# Patient Record
Sex: FEMALE | Race: WHITE | Employment: FULL TIME | URBAN - METROPOLITAN AREA
[De-identification: names, ages, dates, MRNs, and addresses within clinical notes are randomized per-mention and may not be internally consistent; named-entity substitution may affect disease eponyms.]

---

## 2019-06-24 ENCOUNTER — OFFICE VISIT (OUTPATIENT)
Dept: ORTHOPEDIC SURGERY | Age: 49
End: 2019-06-24
Payer: COMMERCIAL

## 2019-06-24 DIAGNOSIS — S46.011A STRAIN OF RIGHT ROTATOR CUFF CAPSULE, INITIAL ENCOUNTER: ICD-10-CM

## 2019-06-24 DIAGNOSIS — M67.911 TENDINOPATHY OF ROTATOR CUFF, RIGHT: Primary | ICD-10-CM

## 2019-06-24 PROCEDURE — G8427 DOCREV CUR MEDS BY ELIG CLIN: HCPCS | Performed by: INTERNAL MEDICINE

## 2019-06-24 PROCEDURE — 99243 OFF/OP CNSLTJ NEW/EST LOW 30: CPT | Performed by: INTERNAL MEDICINE

## 2019-06-24 PROCEDURE — G8421 BMI NOT CALCULATED: HCPCS | Performed by: INTERNAL MEDICINE

## 2019-06-24 RX ORDER — FEXOFENADINE HCL 180 MG/1
180 TABLET ORAL
COMMUNITY
Start: 2019-04-02

## 2019-06-24 NOTE — PROGRESS NOTES
Chief Complaint:   Chief Complaint   Patient presents with    Shoulder Pain     NEW L SHOULDER PAIN          History of Present Illness:       Patient is a 53 y.o. female presents with the above complaint. The symptoms began 3 weeks ago and started without an injury. The patient describes a sharp pain that does not radiate. She is seen in consultation at the request of Dr. Gal Hawley,  The symptoms are intermittentand  are improving since the onset. The abdomen is significantly escalated and correlated with increased activity levels with recreational swimming and sports activity. Work-up has included MRI evaluation which is outlined below    The symptoms do show a typical rotator cuff provacative pattern. The pain is  anterior about the shoulder. There are not associated mechanical symptoms localizing to the shoulder. The patient denies symptoms of instability about the shoulder. Treatment to date has included rest which have afforded significant benefit. Pain levels 1. The symptoms do not correlate with head and neck movement. The patient denies new onset weakness of the upper extremity. The patient does not have history of shoulder trauma. There is no history or autoimmune disease, inflammatory arthropathy or crystal arthropathy. Past Medical History:      No past medical history on file. No past surgical history on file. Present Medications:         Current Outpatient Medications   Medication Sig Dispense Refill    fexofenadine (ALLEGRA) 180 MG tablet Take 180 mg by mouth       No current facility-administered medications for this visit.           Allergies:      No Known Allergies     Social History:         Social History     Socioeconomic History    Marital status:      Spouse name: Not on file    Number of children: Not on file    Years of education: Not on file    Highest education level: Not on file   Occupational History    Not on file   Social Needs  Financial resource strain: Not on file   Street[place] insecurity:     Worry: Not on file     Inability: Not on file    Transportation needs:     Medical: Not on file     Non-medical: Not on file   Tobacco Use    Smoking status: Not on file   Substance and Sexual Activity    Alcohol use: Not on file    Drug use: Not on file    Sexual activity: Not on file   Lifestyle    Physical activity:     Days per week: Not on file     Minutes per session: Not on file    Stress: Not on file   Relationships    Social connections:     Talks on phone: Not on file     Gets together: Not on file     Attends Worship service: Not on file     Active member of club or organization: Not on file     Attends meetings of clubs or organizations: Not on file     Relationship status: Not on file    Intimate partner violence:     Fear of current or ex partner: Not on file     Emotionally abused: Not on file     Physically abused: Not on file     Forced sexual activity: Not on file   Other Topics Concern    Not on file   Social History Narrative    Not on file        Review of Symptoms:    Pertinent items are noted in HPI    Review of systems reviewed from Patient History Form dated on today's date and   available in the patient's chart under the Media tab. Vital Signs: There were no vitals filed for this visit. General Exam:     Constitutional: Patient is adequately groomed with no evidence of malnutrition  Mental Status: The patient is oriented to time, place and person. The patient's mood and affect are appropriate. Vascular: Examination reveals no swelling or calf tenderness. Peripheral pulses are palpable and 2+.     Lymphatics: no lymphadenopathy of the inguinal region or lower extremity      Physical Exam: right shoulder      Primary Exam:    Inspection: No deformity atrophy appreciable effusion      Palpation: No focal tenderness      Range of Motion: Full range and symmetric without pain      Strength: Near-normal without pain      Special Tests: Impingement sign negative proximal biceps signs and subscapularis signs negative      Skin: There are no rashes, ulcerations or lesions. Gait: Nonantalgic      Reflex intact upper     Additional Comments:        Additional Examinations:           Right Upper Extremity:  Examination of the right upper extremity does not show any tenderness, deformity or injury. Range of motion is unremarkable. There is no gross instability. There are no rashes, ulcerations or lesions. Strength and tone are normal.   Neurolgic -Light touch sensation and manual muscle testing normal L2-S1. No fasiculations. Pattella tendon and Achilles tendon reflexes +2 bilaterally. Seated SLR negative          Office Imaging Results/Procedures PerformedToday:      MRI evaluation dated 6/10/2019: Conclusions    1. Mild tendinosis involving the distal supraspinatus and infraspinatus tendons. Peritendinous edema involving the supraspinatus and infraspinatus is observed with a small subacromial and subdeltoid bursal fluid collection consistent with peritendinous inflammation or low-grade injury. 2.  A shallow partial-thickness intrasubstance tear measuring approximately 5 x 5 mm involves the supraspinatus insertional footprint       Office Procedures:     Orders Placed This Encounter   Procedures    Ambulatory referral to Physical Therapy     Referral Priority:   Routine     Referral Type:   Eval and Treat     Referral Reason:   Specialty Services Required     Requested Specialty:   Physical Therapy     Number of Visits Requested:   1           Other Outside Imaging and Testing Personally Reviewed:    No results found. Assessment   Impression: . Encounter Diagnoses   Name Primary?     Tendinopathy of rotator cuff, right Yes    Strain of right rotator cuff capsule, initial encounter         Supraspinatus footprint insertion tendinopathy and superimposed 5 x 5 mm intrasubstance tear suggested by MRI      Plan: Active modification of rotator cuff protocol  Home exercise program physical therapy rotator cuff conditioning  PRN use of NSAIDs GI precaution and ultrasound-guided subacromial injection only for escalating pain levels  Consider candidate for biologic orthopedic injection-PRP injection as needed if symptoms/condition warrants    Overall I believe her presentation is consistent with a acute rotator cuff tendinitis/strain with underlying tendinopathy change. Proceed as outlined above      The nature of the finding, probable diagnosis and likely treatment was thoroughly discussed with the patient. The options, risks, complications, alternative treatment as well as some of the differential diagnosis was discussed. The patient was thoroughly informed and all questions were answered. the patient indicated understanding and satisfaction with the discussion. Orders:        Orders Placed This Encounter   Procedures    Ambulatory referral to Physical Therapy     Referral Priority:   Routine     Referral Type:   Eval and Treat     Referral Reason:   Specialty Services Required     Requested Specialty:   Physical Therapy     Number of Visits Requested:   1           Disclaimer: \"This note was dictated with voice recognition software. Though review and correction are routine, we apologize for any errors. \"

## 2019-07-01 ENCOUNTER — HOSPITAL ENCOUNTER (OUTPATIENT)
Dept: PHYSICAL THERAPY | Age: 49
Setting detail: THERAPIES SERIES
Discharge: HOME | End: 2019-07-01
Payer: COMMERCIAL

## 2019-07-01 PROCEDURE — 97110 THERAPEUTIC EXERCISES: CPT

## 2019-07-01 PROCEDURE — 97161 PT EVAL LOW COMPLEX 20 MIN: CPT

## 2019-07-01 NOTE — FLOWSHEET NOTE
Manual (55552) x  1    [] Other:  [] TA x       [] Mech Traction (71729)  [] ES(attended) (36954)      [] ES (un) (15622):     GOALS:  Patient stated goal: decrease pain with lifting and driving     Therapist goals for Patient:   Short Term Goals: To be achieved in: 2 weeks  1. Independent in HEP and progression per patient tolerance, in order to prevent re-injury. 2. Patient will have a decrease in pain to facilitate improvement in movement, function, and ADLs as indicated by Functional Deficits.     Long Term Goals: To be achieved in: 4-6 weeks  1. Disability index score of 0% or less for the DASH to assist with reaching prior level of function. 2. Patient will demonstrate increased AROM to WNL to allow for proper joint functioning as indicated by patients Functional Deficits. 3. Patient will demonstrate an increase in Strength to within 5lbs to allow for proper functional mobility as indicated by patients Functional Deficits. 4. Patient will return to light lifting functional activities without increased symptoms or restriction. 5. Pt will tolerate driving long distances without increased pain                Progression Towards Functional goals:  [] Patient is progressing as expected towards functional goals listed. [] Progression is slowed due to complexities listed. [] Progression has been slowed due to co-morbidities. [x] Plan just implemented, too soon to assess goals progression  [] Other:     ASSESSMENT:  See eval    Treatment/Activity Tolerance:  [x] Patient tolerated treatment well [] Patient limited by fatique  [] Patient limited by pain  [] Patient limited by other medical complications  [] Other:     Prognosis: [x] Good [] Fair  [] Poor    Patient Requires Follow-up: [x] Yes  [] No    PLAN: See eval  [] Continue per plan of care [] Alter current plan (see comments)  [x] Plan of care initiated [] Hold pending MD visit [] Discharge    Electronically signed by:  Tom Gray PT, DPT

## 2019-07-01 NOTE — PLAN OF CARE
Functional Deficits. 4. Patient will return to light lifting functional activities without increased symptoms or restriction. 5. Pt will tolerate driving long distances without increased pain       Electronically signed by:   Constantino Nunez PT

## 2019-07-09 ENCOUNTER — HOSPITAL ENCOUNTER (OUTPATIENT)
Dept: PHYSICAL THERAPY | Age: 49
Setting detail: THERAPIES SERIES
Discharge: HOME | End: 2019-07-09
Payer: COMMERCIAL

## 2019-07-09 PROCEDURE — 97110 THERAPEUTIC EXERCISES: CPT

## 2019-07-09 PROCEDURE — 97140 MANUAL THERAPY 1/> REGIONS: CPT

## 2019-07-16 ENCOUNTER — HOSPITAL ENCOUNTER (OUTPATIENT)
Dept: PHYSICAL THERAPY | Age: 49
Setting detail: THERAPIES SERIES
Discharge: HOME | End: 2019-07-16
Payer: COMMERCIAL

## 2019-07-16 NOTE — FLOWSHEET NOTE
[facility]    Physical Therapy  Cancellation/No-show Note  Patient Name:  Ivan Mota  :  1970   Date:  2019  Cancelled visits to date: 1  No-shows to date: 0    For today's appointment patient:  [x]  Cancelled  []  Rescheduled appointment  []  No-show     Reason given by patient:  []  Patient ill  []  Conflicting appointment  []  No transportation    []  Conflict with work  [x]  No reason given  []  Other:     Comments:      Electronically signed by:   Yosvany Blanc PT

## 2019-07-17 ENCOUNTER — APPOINTMENT (OUTPATIENT)
Dept: PHYSICAL THERAPY | Age: 49
End: 2019-07-17
Payer: COMMERCIAL

## 2020-07-28 ENCOUNTER — TELEPHONE (OUTPATIENT)
Dept: PRIMARY CARE CLINIC | Age: 50
End: 2020-07-28

## 2021-06-07 ENCOUNTER — OFFICE VISIT (OUTPATIENT)
Dept: PRIMARY CARE CLINIC | Age: 51
End: 2021-06-07
Payer: COMMERCIAL

## 2021-06-07 VITALS
SYSTOLIC BLOOD PRESSURE: 130 MMHG | BODY MASS INDEX: 24.25 KG/M2 | HEART RATE: 68 BPM | OXYGEN SATURATION: 98 % | DIASTOLIC BLOOD PRESSURE: 76 MMHG | WEIGHT: 160 LBS | HEIGHT: 68 IN

## 2021-06-07 DIAGNOSIS — Z12.4 CERVICAL CANCER SCREENING: ICD-10-CM

## 2021-06-07 DIAGNOSIS — Z12.31 ENCOUNTER FOR SCREENING MAMMOGRAM FOR MALIGNANT NEOPLASM OF BREAST: ICD-10-CM

## 2021-06-07 DIAGNOSIS — Z23 NEED FOR TDAP VACCINATION: ICD-10-CM

## 2021-06-07 DIAGNOSIS — Z00.00 ROUTINE ADULT HEALTH MAINTENANCE: Primary | ICD-10-CM

## 2021-06-07 DIAGNOSIS — Z13.220 LIPID SCREENING: ICD-10-CM

## 2021-06-07 PROCEDURE — 90471 IMMUNIZATION ADMIN: CPT | Performed by: NURSE PRACTITIONER

## 2021-06-07 PROCEDURE — 99386 PREV VISIT NEW AGE 40-64: CPT | Performed by: NURSE PRACTITIONER

## 2021-06-07 PROCEDURE — 90715 TDAP VACCINE 7 YRS/> IM: CPT | Performed by: NURSE PRACTITIONER

## 2021-06-07 RX ORDER — B-COMPLEX WITH VITAMIN C
2 TABLET ORAL DAILY
COMMUNITY
Start: 2021-06-07 | End: 2021-09-03 | Stop reason: SINTOL

## 2021-06-07 RX ORDER — MULTIVIT WITH CALCIUM,IRON,MIN 18MG-0.4MG
1 TABLET ORAL DAILY
COMMUNITY
Start: 2021-06-07

## 2021-06-07 RX ORDER — FLUTICASONE PROPIONATE 50 MCG
1 SPRAY, SUSPENSION (ML) NASAL DAILY
COMMUNITY

## 2021-06-07 ASSESSMENT — PATIENT HEALTH QUESTIONNAIRE - PHQ9
SUM OF ALL RESPONSES TO PHQ QUESTIONS 1-9: 0
2. FEELING DOWN, DEPRESSED OR HOPELESS: 0
SUM OF ALL RESPONSES TO PHQ QUESTIONS 1-9: 0
SUM OF ALL RESPONSES TO PHQ QUESTIONS 1-9: 0
1. LITTLE INTEREST OR PLEASURE IN DOING THINGS: 0
SUM OF ALL RESPONSES TO PHQ9 QUESTIONS 1 & 2: 0

## 2021-06-07 ASSESSMENT — ENCOUNTER SYMPTOMS
GASTROINTESTINAL NEGATIVE: 1
CHEST TIGHTNESS: 0
EYES NEGATIVE: 1
APNEA: 0
SHORTNESS OF BREATH: 0

## 2021-06-07 NOTE — PROGRESS NOTES
6/7/2021    Chief Complaint   Patient presents with   [address] [facility] Patient    Annual Exam       Samantha Roberson is a 53 y.o. female, presents today as a new patient to [facility] and myself    HPI   Patient presents for annual physical exam. Patient is overall healthy and denies having any health concerns or complaints today. She does not take daily multivitamin, calcium or vitamin D; recommended patient to start taking daily. Seasonal and environmental allergies  Patient has a history of well controlled seasonal and environmental allergies that is managed by an allergist. Patient is taking Flonase, 1 spray per nostril and Allegra daily. CARE GAPS:  - Mammogram: ordered today. Patient to schedule on mobile unit in mid-June. - Tdap: Administer today. - Cervical cancer screening: last PAP ~ 2018. Referral to Dr. Tonie Jane today. - Lipid screen: Ordered today. Review of Systems   Constitutional: Negative for activity change, appetite change, fatigue and unexpected weight change. HENT: Negative. Eyes: Negative. Respiratory: Negative for apnea, chest tightness and shortness of breath. Cardiovascular: Negative for chest pain, palpitations and leg swelling. Gastrointestinal: Negative. Endocrine: Negative. Genitourinary: Negative. Musculoskeletal: Negative for arthralgias and myalgias. Neurological: Negative for dizziness, weakness, light-headedness and headaches. Psychiatric/Behavioral: Negative for dysphoric mood. The patient is not nervous/anxious. Current Outpatient Medications on File Prior to Visit   Medication Sig Dispense Refill    fluticasone (FLONASE) 50 MCG/ACT nasal spray 1 spray by Each Nostril route daily      fexofenadine (ALLEGRA) 180 MG tablet Take 180 mg by mouth       No current facility-administered medications on file prior to visit.       Allergies   Allergen Reactions    Ibuprofen Hives    Sulfa Antibiotics Hives     Past Medical Mood and Affect: Mood normal.         Behavior: Behavior normal.         ASSESSMENT/PLAN:  1. Routine adult health maintenance  - No abnormalities noted. - Start Calcium Carbonate-Vitamin D (OYSTER SHELL CALCIUM/D) 500-200 MG-UNIT TABS; Take 2 tablets by mouth daily  - Start Multiple Vitamins-Minerals (QC WOMENS DAILY MULTIVITAMIN) TABS; Take 1 tablet by mouth daily    2. Need for Tdap vaccination  - Tdap (age 6y and older) IM (Boostrix) -  Administered. 3. Encounter for screening mammogram for malignant neoplasm of breast  - [facility] PREM DIGITAL SCREEN BILATERAL; Future  - Patient to schedule; phone number provided. 4. Cervical cancer screening  - [facility] - Dodie Amor MD, Obstetrics, [facility] - [place] [place]  - Patient to schedule an appointment; phone number provided. 5. Lipid screening  - Lipid, Fasting; Future  - Will call patient with results. Return in about 1 year (around 6/7/2022) for annual physical exam.     Discussed use, benefit, and side effects of prescribed medications. Patient's questions answered and concerns addressed. Patient agrees to plan of care. My scheduled days in the office reviewed with patient, and same day appointments available. Encouraged to use [employer] for communication as needed.      Electronically signed by ROGELIO Hernandez CNP on 6/7/2021 at 5:15 PM

## 2021-06-18 ENCOUNTER — HOSPITAL ENCOUNTER (OUTPATIENT)
Dept: MAMMOGRAPHY | Age: 51
Discharge: HOME | End: 2021-06-22
Payer: COMMERCIAL

## 2021-06-18 VITALS — WEIGHT: 152 LBS | HEIGHT: 67 IN | BODY MASS INDEX: 23.86 KG/M2

## 2021-06-18 DIAGNOSIS — Z12.31 VISIT FOR SCREENING MAMMOGRAM: ICD-10-CM

## 2021-06-18 PROCEDURE — 77063 BREAST TOMOSYNTHESIS BI: CPT

## 2021-06-21 ENCOUNTER — TELEPHONE (OUTPATIENT)
Dept: PRIMARY CARE CLINIC | Age: 51
End: 2021-06-21

## 2021-06-21 NOTE — TELEPHONE ENCOUNTER
----- Message from Rohan Watson sent at 6/18/2021  4:19 PM EDT -----  Subject: Message to Provider    QUESTIONS  Information for Provider? Patient would like a doctors note for work. Appointment date and time 6/18/2021 3:30  ---------------------------------------------------------------------------  --------------  CALL BACK INFO  What is the best way for the office to contact you? OK to leave message on   voicemail  Preferred Call Back Phone Number? [phone]  ---------------------------------------------------------------------------  --------------  SCRIPT ANSWERS  Relationship to Patient?  Self

## 2021-06-24 ENCOUNTER — TELEPHONE (OUTPATIENT)
Dept: WOMENS IMAGING | Age: 51
End: 2021-06-24

## 2021-06-29 ENCOUNTER — TELEPHONE (OUTPATIENT)
Dept: WOMENS IMAGING | Age: 51
End: 2021-06-29

## 2021-07-01 ENCOUNTER — TELEPHONE (OUTPATIENT)
Dept: PRIMARY CARE CLINIC | Age: 51
End: 2021-07-01

## 2021-07-01 DIAGNOSIS — R92.8 ABNORMAL MAMMOGRAM OF RIGHT BREAST: Primary | ICD-10-CM

## 2021-07-01 NOTE — TELEPHONE ENCOUNTER
Called patient to review screening mammogram results. Patient verified full name and date of birth prior to reviewing results. - Breast density: The breasts are heterogeneously dense, which may obscure  small masses. - Left breast: No new suspicious masses or microcalcifications are identified  in the left breast.    - Right breast: Asymmetry for which additional imaging evaluation is  recommended with diagnostic mammography and potential breast ultrasound.       Additional Images:   - Right breast diagnostic mammogram and US breast ordered on 6/29/2021 by Dr. Janet Tom- both scheduled on 7/7/2021 at [facility].

## 2021-07-07 ENCOUNTER — HOSPITAL ENCOUNTER (OUTPATIENT)
Dept: ULTRASOUND IMAGING | Age: 51
Discharge: HOME | End: 2021-07-07
Payer: COMMERCIAL

## 2021-07-07 ENCOUNTER — HOSPITAL ENCOUNTER (OUTPATIENT)
Dept: WOMENS IMAGING | Age: 51
Discharge: HOME | End: 2021-07-07
Payer: COMMERCIAL

## 2021-07-07 DIAGNOSIS — R92.8 ABNORMAL FINDING ON MAMMOGRAPHY: ICD-10-CM

## 2021-07-07 PROCEDURE — 76642 ULTRASOUND BREAST LIMITED: CPT

## 2021-07-07 PROCEDURE — G0279 TOMOSYNTHESIS, MAMMO: HCPCS

## 2021-07-23 DIAGNOSIS — Z13.220 LIPID SCREENING: ICD-10-CM

## 2021-07-23 LAB
CHOLESTEROL, FASTING: 200 MG/DL (ref 0–199)
HDLC SERPL-MCNC: 75 MG/DL (ref 40–60)
LDL CHOLESTEROL CALCULATED: 112 MG/DL
TRIGLYCERIDE, FASTING: 65 MG/DL (ref 0–150)
VLDLC SERPL CALC-MCNC: 13 MG/DL

## 2021-08-11 ENCOUNTER — TELEPHONE (OUTPATIENT)
Dept: PRIMARY CARE CLINIC | Age: 51
End: 2021-08-11

## 2021-08-11 NOTE — TELEPHONE ENCOUNTER
I spoke with Clayton Napier and she needs an order for a Mammogram in 6 months from the date of her last mammogram. Please call patient to advise.

## 2021-08-16 DIAGNOSIS — N60.01 BENIGN CYST OF RIGHT BREAST: Primary | ICD-10-CM

## 2021-08-16 NOTE — PROGRESS NOTES
Patient sent my chart message requesting 6-month follow-up mammogram order of right breast.  Right mammogram ordered patient notified via [employer] message. EXAMINATION:   DIAGNOSTIC DIGITAL RIGHT BREAST MAMMOGRAM WITH TOMOSYNTHESIS; TARGETED   ULTRASOUND OF THE RIGHT BREAST, 7/7/2021 7:57 am; 7/7/2021 8:12 am       TECHNIQUE:   Diagnostic mammography of the right breast was performed with tomosynthesis.    2D standard and 3D tomosynthesis combination imaging performed through the   right breast.  Computer aided detection was utilized in the interpretation of   this exam.; Targeted ultrasound of the right breast was performed.       Views: 3       COMPARISON:   06/18/2021       HISTORY:   ORDERING SYSTEM PROVIDED HISTORY: Abnormal finding on mammography   TECHNOLOGIST PROVIDED HISTORY:   Is the patient pregnant?->No; ORDERING SYSTEM PROVIDED HISTORY: Abnormal   finding on mammography       FINDINGS:   Today's images confirm persistence of 2 small partially circumscribed 5 mm   nodular asymmetries within the far posterior 12 o'clock aspect of the right   breast.       Targeted right breast ultrasound was performed demonstrating 3 tiny simple   cysts within the 11 and 12 o'clock positions approximately 4-6 cm from the   nipple.  These range in size from 4-6 mm.  There are no suspicious solid   masses or focal areas of posterior acoustic shadowing.  These are felt to   likely correspond with the mammographic asymmetries.           Impression   Probably benign right breast cysts.       BIRADS:   ACR BI-RADS category 3-probably benign.       Recommendation: Six-month follow-up right breast mammography to document the   benign nature of findings.

## 2021-09-03 ENCOUNTER — OFFICE VISIT (OUTPATIENT)
Dept: PRIMARY CARE CLINIC | Age: 51
End: 2021-09-03
Payer: COMMERCIAL

## 2021-09-03 VITALS
WEIGHT: 155 LBS | SYSTOLIC BLOOD PRESSURE: 110 MMHG | DIASTOLIC BLOOD PRESSURE: 78 MMHG | HEIGHT: 67 IN | OXYGEN SATURATION: 98 % | BODY MASS INDEX: 24.33 KG/M2 | HEART RATE: 70 BPM

## 2021-09-03 DIAGNOSIS — M79.672 LEFT FOOT PAIN: Primary | ICD-10-CM

## 2021-09-03 DIAGNOSIS — Z00.00 HEALTH CARE MAINTENANCE: ICD-10-CM

## 2021-09-03 PROCEDURE — 99213 OFFICE O/P EST LOW 20 MIN: CPT | Performed by: NURSE PRACTITIONER

## 2021-09-03 RX ORDER — CALCIUM CARBONATE 200(500)MG
TABLET,CHEWABLE ORAL
COMMUNITY
Start: 2021-09-03

## 2021-09-03 RX ORDER — MELATONIN
1000 DAILY
COMMUNITY
Start: 2021-09-03

## 2021-09-03 RX ORDER — METHYLPREDNISOLONE 4 MG/1
TABLET ORAL
Qty: 1 KIT | Refills: 0 | Status: SHIPPED | OUTPATIENT
Start: 2021-09-03 | End: 2021-09-09

## 2021-09-03 NOTE — PROGRESS NOTES
9/3/2021    Chief Complaint   Patient presents with    Foot Pain     left foot lasting for 2 weeks       Marzena Sandoval is a 53 y.o. female, presents today for left foot pain    HPI  Foot pain  Patient reports pain to top of left foot with mild swelling with walking. The pain is at a severity of 4/10, and moderate. Exacerbated by: walking without shoes. 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 She started using a Theragun (massage guns) to bottom of left foot- with significant improvement of pain. History of collapsing arches, bilaterally. She admits prior to onset of pain she was predominantly barefoot around house and works from home. She started to use shoe inserts 3 days ago, with significant improvement and is able to bear weight without pain. She is unable to tolerate NSAIDS. She believes she took a Medrol Dosepak in the past. S/p fracture to left 5th metatarsal with plate/screws (5026). Pertinent negatives include no inability to bear weight, joint swelling, limited range of motion, numbness, stiffness or tingling. Osteoporosis prevention   Patient our last visit Nic Morris was initiated, however after patient read ingrediants she realized medication was contraindicated and did not take due to shellfish allergy. Shellfish allergy added to chart today. Will initiate TUMS 1000 mg daily and Vitamin D 5,000 daily. Mother with history of osteoporosis. Review of Systems   Constitutional: Negative for activity change. Musculoskeletal: Negative for gait problem, joint swelling and stiffness. Left foot pain   Neurological: Negative for tingling, weakness and numbness.        Current Outpatient Medications on File Prior to Visit   Medication Sig Dispense Refill    fluticasone (FLONASE) 50 MCG/ACT nasal spray 1 spray by Each Nostril route daily      Multiple Vitamins-Minerals (QC WOMENS DAILY MULTIVITAMIN) TABS Take 1 tablet by mouth daily      fexofenadine (ALLEGRA) 180 MG tablet Take 180 mg by mouth       No current facility-administered medications on file prior to visit. Allergies   Allergen Reactions    Ibuprofen Hives    Shellfish-Derived Products      Hives, swelling    Sulfa Antibiotics Hives     Past Medical History:   Diagnosis Date    Environmental and seasonal allergies      Past Surgical History:   Procedure Laterality Date    BREAST BIOPSY      WISDOM TOOTH EXTRACTION        Social History     Tobacco Use    Smoking status: Never Smoker    Smokeless tobacco: Never Used   Substance Use Topics    Alcohol use: Yes     Comment: occassional      Family History   Problem Relation Age of Onset    Heart Disease Mother     Hypertension Mother     Heart Disease Father     Diabetes type 2  Father         Diagnosed between late 48s-early 56s   [employer] Kidney Disease Father         Renal Failure    Hypertension Father     No Known Problems Brother     No Known Problems Daughter     No Known Problems Daughter         Vitals:    09/03/21 1320   BP: 110/78   Site: Left Upper Arm   Position: Sitting   Cuff Size: Medium Adult   Pulse: 70   SpO2: 98%   Weight: 155 lb (70.3 kg)   Height: 5' 7\" (1.702 m)     Estimated body mass index is 24.28 kg/m² as calculated from the following:    Height as of this encounter: 5' 7\" (1.702 m). Weight as of this encounter: 155 lb (70.3 kg). Physical Exam  Vitals and nursing note reviewed. Constitutional:       General: She is not in acute distress. Appearance: Normal appearance. She is normal weight. Cardiovascular:      Rate and Rhythm: Normal rate and regular rhythm. Pulses: Normal pulses. Dorsalis pedis pulses are 2+ on the left side. Heart sounds: Normal heart sounds, S1 normal and S2 normal.   Pulmonary:      Effort: Pulmonary effort is normal.      Breath sounds: Normal breath sounds. Musculoskeletal:         General: Normal range of motion. Cervical back: Normal range of motion and neck supple.       Right lower leg: No edema. Left lower leg: No edema. Feet:    Feet:      Left foot:      Skin integrity: Skin integrity normal. No skin breakdown, erythema, warmth or dry skin. Toenail Condition: Left toenails are normal.      Comments: No pain with palpation  Skin:     General: Skin is warm and dry. Neurological:      General: No focal deficit present. Mental Status: She is alert and oriented to person, place, and time. Psychiatric:         Mood and Affect: Mood normal.         Behavior: Behavior normal.         ASSESSMENT/PLAN:  1. Left foot pain  - New.  -Start methylPREDNISolone (MEDROL DOSEPACK) 4 MG tablet; Take by mouth daily as prescribed on packaging  Dispense: 1 kit; Refill: 0 (instructed patient to take all pills for each day all in the morning with breakfast rather than indicated on pack)  -Tylenol 500 to 8000 mg every 8 hours as needed for pain. -Ice  -Continue to wear supportive shoes with inserts at all times.  -Continue Theragun   -Send [employer] message if no improvement and will refer to podiatrist; patient verbalizes understanding. 2. Health care maintenance  -Start calcium carbonate (ANTACID) 500 MG chewable tablet; Chew 2 tablets daily (1000 mg) by mouth daily  -Start vitamin D3 (CHOLECALCIFEROL) 25 MCG (1000 UT) TABS tablet; Take 1 tablet by mouth daily  -Shellfish allergy added to chart today. Return if symptoms worsen or fail to improve and will refer to podiatrist.     Discussed use, benefit, and side effects of prescribed medications. Patient's questions answered and concerns addressed. Patient agrees to plan of care. My scheduled days in the office reviewed with patient, and same day appointments available. Encouraged to use [employer] for communication as needed. Electronically signed by ROGELIO Garzon CNP on 9/3/2021 at 3:32 PM       This dictation was generated by voice recognition computer software.  Although all attempts are made to edit the dictation for accuracy, there may be errors in the transcription that are not intended.

## 2021-09-09 LAB
HPV COMMENT: NORMAL
HPV TYPE 16: NOT DETECTED
HPV TYPE 18: NOT DETECTED
HPVOH (OTHER TYPES): NOT DETECTED

## 2021-09-14 ENCOUNTER — PATIENT MESSAGE (OUTPATIENT)
Dept: PRIMARY CARE CLINIC | Age: 51
End: 2021-09-14

## 2021-09-14 DIAGNOSIS — Z13.220 LIPID SCREENING: Primary | ICD-10-CM

## 2021-09-14 DIAGNOSIS — Z13.1 DIABETES MELLITUS SCREENING: ICD-10-CM

## 2021-09-15 NOTE — TELEPHONE ENCOUNTER
From: Renaldo See  To: Ebonykarly Karissaluis, APRN - CNP  Sent: 9/14/2021 6:49 PM EDT  Subject: Visit Follow-Up Question    Hi,    I wanted to check back with you on my foot pain that you saw me for on September 3. Its A LOT better, it does at times feel a little stiff and sore but nothing compared to what it was. I wanted ask if I could do bloodwork? when I have them done recently it just showed cholesterols. I would like repeat that, as well as find out what some of my other tests would show, such as sugar, tryglycerides etc. would this be possible? I would like to see where I'm at now so I have a baseline since I have been working on some dietary and exercise changes.     thank you,  Krista Yu

## 2021-10-25 ENCOUNTER — PATIENT MESSAGE (OUTPATIENT)
Dept: PRIMARY CARE CLINIC | Age: 51
End: 2021-10-25

## 2021-10-25 DIAGNOSIS — B37.9 YEAST INFECTION: Primary | ICD-10-CM

## 2021-10-25 DIAGNOSIS — B37.31 VAGINAL YEAST INFECTION: ICD-10-CM

## 2021-10-26 RX ORDER — FLUCONAZOLE 150 MG/1
150 TABLET ORAL ONCE
Qty: 1 TABLET | Refills: 0 | Status: SHIPPED | OUTPATIENT
Start: 2021-10-26 | End: 2021-10-26

## 2021-12-01 ENCOUNTER — HOSPITAL ENCOUNTER (OUTPATIENT)
Dept: WOMENS IMAGING | Age: 51
Discharge: HOME | End: 2021-12-01
Payer: COMMERCIAL

## 2021-12-01 DIAGNOSIS — N60.01 BENIGN CYST OF RIGHT BREAST: ICD-10-CM

## 2021-12-01 PROCEDURE — G0279 TOMOSYNTHESIS, MAMMO: HCPCS

## 2021-12-10 DIAGNOSIS — Z13.1 DIABETES MELLITUS SCREENING: ICD-10-CM

## 2021-12-10 DIAGNOSIS — Z13.220 LIPID SCREENING: ICD-10-CM

## 2021-12-10 LAB
CHOLESTEROL, FASTING: 176 MG/DL (ref 0–199)
HDLC SERPL-MCNC: 72 MG/DL (ref 40–60)
LDL CHOLESTEROL CALCULATED: 89 MG/DL
TRIGLYCERIDE, FASTING: 74 MG/DL (ref 0–150)
VLDLC SERPL CALC-MCNC: 15 MG/DL

## 2021-12-11 LAB
ESTIMATED AVERAGE GLUCOSE: 102.5 MG/DL
HBA1C MFR BLD: 5.2 %

## 2022-02-25 ENCOUNTER — PATIENT MESSAGE (OUTPATIENT)
Dept: PRIMARY CARE CLINIC | Age: 52
End: 2022-02-25

## 2022-02-25 DIAGNOSIS — M54.50 ACUTE BILATERAL LOW BACK PAIN WITHOUT SCIATICA: Primary | ICD-10-CM

## 2022-02-25 DIAGNOSIS — M62.89 MUSCLE TIGHTNESS: ICD-10-CM

## 2022-02-25 RX ORDER — CYCLOBENZAPRINE HCL 5 MG
5 TABLET ORAL 2 TIMES DAILY PRN
Qty: 10 TABLET | Refills: 0 | Status: SHIPPED | OUTPATIENT
Start: 2022-02-25 | End: 2022-03-07

## 2022-02-25 RX ORDER — METHYLPREDNISOLONE 4 MG/1
TABLET ORAL
Qty: 1 KIT | Refills: 0 | Status: SHIPPED | OUTPATIENT
Start: 2022-02-25 | End: 2022-03-03

## 2022-02-25 NOTE — TELEPHONE ENCOUNTER
1. Acute bilateral low back pain without sciatica  - New  - Start methylPREDNISolone (MEDROL DOSEPACK) 4 MG tablet; TAKE TABLETS BY MOUTH AS DIRECTED ON DOSEPAK  Dispense: 1 kit; Refill: 0    2. Muscle tightness  - New  - Start cyclobenzaprine (FLEXERIL) 5 MG tablet; Take 1 tablet by mouth 2 times daily as needed for Muscle spasms  Dispense: 10 tablet;  Refill: 0

## 2022-02-25 NOTE — TELEPHONE ENCOUNTER
From: Juanita Prakash  To: Jose   Sent: 2/25/2022 10:49 AM EST  Subject: Monday Appt    Kenny Bazzi,    I have an appointment with you on Monday at 7pm for low back pain. Im really having a good amount of pain, my low back feels like a ball of tightness. I cant take Ibuprofen and tylenol isn't doing a great deal.     I am applying heat and ice which help some. Do you have any recommendations how I can manage this until Monday evening?     thank you,  Kelly Naylor

## 2022-02-28 ENCOUNTER — OFFICE VISIT (OUTPATIENT)
Dept: PRIMARY CARE CLINIC | Age: 52
End: 2022-02-28
Payer: COMMERCIAL

## 2022-02-28 VITALS
WEIGHT: 164.6 LBS | HEART RATE: 63 BPM | DIASTOLIC BLOOD PRESSURE: 62 MMHG | BODY MASS INDEX: 25.83 KG/M2 | OXYGEN SATURATION: 97 % | TEMPERATURE: 96.8 F | SYSTOLIC BLOOD PRESSURE: 106 MMHG | HEIGHT: 67 IN

## 2022-02-28 DIAGNOSIS — M54.50 ACUTE MIDLINE LOW BACK PAIN WITHOUT SCIATICA: Primary | ICD-10-CM

## 2022-02-28 PROCEDURE — 99213 OFFICE O/P EST LOW 20 MIN: CPT | Performed by: NURSE PRACTITIONER

## 2022-02-28 RX ORDER — ACETAMINOPHEN 500 MG
1000 TABLET ORAL EVERY 6 HOURS PRN
COMMUNITY

## 2022-02-28 ASSESSMENT — ENCOUNTER SYMPTOMS
BACK PAIN: 1
RESPIRATORY NEGATIVE: 1
CONSTIPATION: 0
DIARRHEA: 0

## 2022-03-01 NOTE — PATIENT INSTRUCTIONS
Patient Education        Acute Low Back Pain: Exercises  Introduction  Here are some examples of typical rehabilitation exercises for your condition. Start each exercise slowly. Ease off the exercise if you start to have pain. Your doctor or physical therapist will tell you when you can start these exercises and which ones will work best for you. When you are not being active, find a comfortable position for rest. Some people are comfortable on the floor or a medium-firm bed with a small pillow under their head and another under their knees. Some people prefer to lie on their side with a pillow between their knees. Don't stay in one position for too long. Take short walks (10 to 20 minutes) every 2 to 3 hours. Avoid slopes, hills, and stairs until you feel better. Walk only distances you can manage without pain, especially leg pain. How to do the exercises  Back stretches    1. Get down on your hands and knees on the floor. 2. Relax your head and allow it to droop. Round your back up toward the ceiling until you feel a nice stretch in your upper, middle, and lower back. Hold this stretch for as long as it feels comfortable, or about 15 to 30 seconds. 3. Return to the starting position with a flat back while you are on your hands and knees. 4. Let your back sway by pressing your stomach toward the floor. Lift your buttocks toward the ceiling. 5. Hold this position for 15 to 30 seconds. 6. Repeat 2 to 4 times. Follow-up care is a key part of your treatment and safety. Be sure to make and go to all appointments, and call your doctor if you are having problems. It's also a good idea to know your test results and keep a list of the medicines you take. Where can you learn more? Go to https://cam.[employer]. org and sign in to your [employer] account. Enter W685 in the [employer] box to learn more about \"Acute Low Back Pain: Exercises. \"     If you do not have an account, please click on the \"Sign Up Now\" link. Current as of: September 8, 2021               Content Version: 13.1  © 2006-2021 [employer]. Care instructions adapted under license by [facility] ([facility]). If you have questions about a medical condition or this instruction, always ask your healthcare professional. [address] any warranty or liability for your use of this information. Patient Education        Low Back Pain: Exercises  Introduction  Here are some examples of exercises for you to try. The exercises may be suggested for a condition or for rehabilitation. Start each exercise slowly. Ease off the exercises if you start to have pain. You will be told when to start these exercises and which ones will work best for you. How to do the exercises  Press-up    1. Lie on your stomach, supporting your body with your forearms. 2. Press your elbows down into the floor to raise your upper back. As you do this, relax your stomach muscles and allow your back to arch without using your back muscles. As your press up, do not let your hips or pelvis come off the floor. 3. Hold for 15 to 30 seconds, then relax. 4. Repeat 2 to 4 times. Alternate arm and leg (bird dog) exercise    Do this exercise slowly. Try to keep your body straight at all times, and do not let one hip drop lower than the other. 1. Start on the floor, on your hands and knees. 2. Tighten your belly muscles. 3. Raise one leg off the floor, and hold it straight out behind you. Be careful not to let your hip drop down, because that will twist your trunk. 4. Hold for about 6 seconds, then lower your leg and switch to the other leg. 5. Repeat 8 to 12 times on each leg. 6. Over time, work up to holding for 10 to 30 seconds each time. 7. If you feel stable and secure with your leg raised, try raising the opposite arm straight out in front of you at the same time. Knee-to-chest exercise    1.  Lie on your back with your knees bent and your feet flat on the floor. 2. Bring one knee to your chest, keeping the other foot flat on the floor (or keeping the other leg straight, whichever feels better on your lower back). 3. Keep your lower back pressed to the floor. Hold for at least 15 to 30 seconds. 4. Relax, and lower the knee to the starting position. 5. Repeat with the other leg. Repeat 2 to 4 times with each leg. 6. To get more stretch, put your other leg flat on the floor while pulling your knee to your chest.  Curl-ups    1. Lie on the floor on your back with your knees bent at a 90-degree angle. Your feet should be flat on the floor, about 12 inches from your buttocks. 2. Cross your arms over your chest. If this bothers your neck, try putting your hands behind your neck (not your head), with your elbows spread apart. 3. Slowly tighten your belly muscles and raise your shoulder blades off the floor. 4. Keep your head in line with your body, and do not press your chin to your chest.  5. Hold this position for 1 or 2 seconds, then slowly lower yourself back down to the floor. 6. Repeat 8 to 12 times. Pelvic tilt exercise    1. Lie on your back with your knees bent. 2. \"Brace\" your stomach. This means to tighten your muscles by pulling in and imagining your belly button moving toward your spine. You should feel like your back is pressing to the floor and your hips and pelvis are rocking back. 3. Hold for about 6 seconds while you breathe smoothly. 4. Repeat 8 to 12 times. Heel dig bridging    1. Lie on your back with both knees bent and your ankles bent so that only your heels are digging into the floor. Your knees should be bent about 90 degrees. 2. Then push your heels into the floor, squeeze your buttocks, and lift your hips off the floor until your shoulders, hips, and knees are all in a straight line.   3. Hold for about 6 seconds as you continue to breathe normally, and then slowly lower your hips back down to the floor and rest for up to 10 seconds. 4. Do 8 to 12 repetitions. Hamstring stretch in doorway    1. Lie on your back in a doorway, with one leg through the open door. 2. Slide your leg up the wall to straighten your knee. You should feel a gentle stretch down the back of your leg. 3. Hold the stretch for at least 15 to 30 seconds. Do not arch your back, point your toes, or bend either knee. Keep one heel touching the floor and the other heel touching the wall. 4. Repeat with your other leg. 5. Do 2 to 4 times for each leg. Hip flexor stretch    1. Kneel on the floor with one knee bent and one leg behind you. Place your forward knee over your foot. Keep your other knee touching the floor. 2. Slowly push your hips forward until you feel a stretch in the upper thigh of your rear leg. 3. Hold the stretch for at least 15 to 30 seconds. Repeat with your other leg. 4. Do 2 to 4 times on each side. Wall sit    1. Stand with your back 10 to 12 inches away from a wall. 2. Lean into the wall until your back is flat against it. 3. Slowly slide down until your knees are slightly bent, pressing your lower back into the wall. 4. Hold for about 6 seconds, then slide back up the wall. 5. Repeat 8 to 12 times. Follow-up care is a key part of your treatment and safety. Be sure to make and go to all appointments, and call your doctor if you are having problems. It's also a good idea to know your test results and keep a list of the medicines you take. Where can you learn more? Go to https://EuroMillions.[employer] Ltd.peKik.[employer]. org and sign in to your [employer] account. Enter N087 in the [facility] box to learn more about \"Low Back Pain: Exercises. \"     If you do not have an account, please click on the \"Sign Up Now\" link. Current as of: July 1, 2021               Content Version: 13.1  © 3760-6713 [employer]. Care instructions adapted under license by [facility] ([facility]).  If you have questions about a medical condition or this instruction, always ask your healthcare professional. [place] [address] any warranty or liability for your use of this information. Patient Education        Back Pain, Emergency or Urgent Symptoms: Care Instructions  Your Care Instructions     Many people have back pain at one time or another. In most cases, pain gets better with self-care that includes over-the-counter pain medicine, ice, heat, and exercises. Unless you have symptoms of a severe injury or heart attack, you may be able to give yourself a few days before you call a doctor. But some back problems are very serious. Do not ignore symptoms that need to be checked right away. Follow-up care is a key part of your treatment and safety. Be sure to make and go to all appointments, and call your doctor if you are having problems. It's also a good idea to know your test results and keep a list of the medicines you take. How can you care for yourself at home? · Sit or lie in positions that are most comfortable and that reduce your pain. Try one of these positions when you lie down:  ? Lie on your back with your knees bent and supported by large pillows. ? Lie on the floor with your legs on the seat of a sofa or chair. ? Lie on your side with your knees and hips bent and a pillow between your legs. ? Lie on your stomach if it does not make pain worse. · Do not sit up in bed, and avoid soft couches and twisted positions. Bed rest can help relieve pain at first, but it delays healing. Avoid bed rest after the first day. · Change positions every 30 minutes. If you must sit for long periods of time, take breaks from sitting. Get up and walk around, or lie flat. · Try using a heating pad on a low or medium setting, for 15 to 20 minutes every 2 or 3 hours. Try a warm shower in place of one session with the heating pad. You can also buy single-use heat wraps that last up to 8 hours.  You can also try ice or cold packs on your back for 10 to 20 minutes at a time, several times a day. (Put a thin cloth between the ice pack and your skin.) This reduces pain and makes it easier to be active and exercise. · Take pain medicines exactly as directed. ? If the doctor gave you a prescription medicine for pain, take it as prescribed. ? If you are not taking a prescription pain medicine, ask your doctor if you can take an over-the-counter medicine. When should you call for help? Call 911 anytime you think you may need emergency care. For example, call if:    · You are unable to move a leg at all.     · You have back pain with severe belly pain.     · You have symptoms of a heart attack. These may include:  ? Chest pain or pressure, or a strange feeling in the chest.  ? Sweating. ? Shortness of breath. ? Nausea or vomiting. ? Pain, pressure, or a strange feeling in the back, neck, jaw, or upper belly or in one or both shoulders or arms. ? Lightheadedness or sudden weakness. ? A fast or irregular heartbeat. After you call 911, the  may tell you to chew 1 adult-strength or 2 to 4 low-dose aspirin. Wait for an ambulance. Do not try to drive yourself. Call your doctor now or seek immediate medical care if:    · You have new or worse symptoms in your arms, legs, chest, belly, or buttocks. Symptoms may include:  ? Numbness or tingling. ? Weakness. ? Pain.     · You lose bladder or bowel control.     · You have back pain and:  ? You have injured your back while lifting or doing some other activity. Call if the pain is severe, has not gone away after 1 or 2 days, and you cannot do your normal daily activities. ? You have had a back injury before that needed treatment. ? Your pain has lasted longer than 4 weeks. ? You have had weight loss you cannot explain. ? You have a fever. ? You are age 48 or older. ? You have cancer now or have had it before.    Watch closely for changes in your health, and be sure to contact your doctor if you are not getting better as expected. Where can you learn more? Go to https://chpepiceweb.Ateeda. org and sign in to your G[employer]park account. Enter V986 in the Walk-in Appointment Scheduler box to learn more about \"Back Pain, Emergency or Urgent Symptoms: Care Instructions. \"     If you do not have an account, please click on the \"Sign Up Now\" link. Current as of: July 1, 2021               Content Version: 13.1  © 2006-2021 [employer]. Care instructions adapted under license by [employer]. If you have questions about a medical condition or this instruction, always ask your healthcare professional. [address] any warranty or liability for your use of this information.

## 2022-03-01 NOTE — PROGRESS NOTES
2/28/2022    Chief Complaint   Patient presents with    Back Pain     It's been going on for 4 days. Adi Zelaya is a 49 y.o. female, presents today for back pain    HPI   Back pain  Patient presents for back pain follow up. Pain is localized to lower midline back and does not radiate. She scribes pain as \"a big knot, type\" to lower midline back. Onset was 5 days ago that progressively worsened. She reports back pain at 4-5/10, in the morning pain is 9/10. Back pain worse upon waking in the morning, and better in the evenings after loosing up during the day. She denies changes in bowel or bladder function. She denies incontinence of urine and stool. She denies saddle anesthesia      She is doing back exercises/stretches once a day in the morning- Instructed to increase to 3 times a day. Heating pad has been helpful in decreasing pain. Patient to apply heating pad in the morning before getting out of bed than do exercises/stretches. She is taking a Medrol Dosepak as prescribed and tolerating well (2 days left). She is also taking Tylenol 1000 mg every 6 hours. 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  Instructed patient to take 1/2 tablet if she has concerns about taking. She reports anaphylactic reaction with NSAIDS. New mattress purchase in 2018- Posturedic Medium firmness. Patient is a back sleeper and recently has been placing pillows underneath her knees to sleep. Review of Systems   Constitutional: Negative for activity change, appetite change, fatigue and unexpected weight change. Respiratory: Negative. Cardiovascular: Negative. Gastrointestinal: Negative for constipation and diarrhea. Genitourinary: Negative for difficulty urinating, dysuria, flank pain, frequency, hematuria and urgency.    Musculoskeletal: Positive for back pain. Negative for arthralgias, gait problem and myalgias. Neurological: Negative for weakness and numbness. Current Outpatient Medications on File Prior to Visit   Medication Sig Dispense Refill    acetaminophen (TYLENOL) 500 MG tablet Take 1,000 mg by mouth every 6 hours as needed for Pain Moderate (4-6)      methylPREDNISolone (MEDROL DOSEPACK) 4 MG tablet TAKE TABLETS BY MOUTH AS DIRECTED ON DOSEPAK 1 kit 0    cyclobenzaprine (FLEXERIL) 5 MG tablet Take 1 tablet by mouth 2 times daily as needed for Muscle spasms 10 tablet 0    calcium carbonate (ANTACID) 500 MG chewable tablet Chew 2 tablets daily (1000 mg) by mouth daily      vitamin D3 (CHOLECALCIFEROL) 25 MCG (1000 UT) TABS tablet Take 1 tablet by mouth daily      fluticasone (FLONASE) 50 MCG/ACT nasal spray 1 spray by Each Nostril route daily      Multiple Vitamins-Minerals (QC WOMENS DAILY MULTIVITAMIN) TABS Take 1 tablet by mouth daily      fexofenadine (ALLEGRA) 180 MG tablet Take 180 mg by mouth       No current facility-administered medications on file prior to visit.       Allergies   Allergen Reactions    Ibuprofen Anaphylaxis    Shellfish-Derived Products      Hives, swelling    Sulfa Antibiotics Hives     Past Medical History:   Diagnosis Date    Environmental and seasonal allergies      Past Surgical History:   Procedure Laterality Date    BREAST BIOPSY      WISDOM TOOTH EXTRACTION        Social History     Tobacco Use    Smoking status: Never Smoker    Smokeless tobacco: Never Used   Substance Use Topics    Alcohol use: Yes     Comment: occassional      Family History   Problem Relation Age of Onset    Heart Disease Mother     Hypertension Mother     Heart Disease Father     Diabetes type 2  Father         Diagnosed between late 49s-early 62s    Kidney Disease Father         Renal Failure    Hypertension Father     No Known Problems Brother     No Known Problems Daughter     No Known Problems Daughter         Vitals:    02/28/22 1911   BP: 106/62   Site: Left Upper Arm   Position: Sitting   Cuff Size: Medium Adult   Pulse: 63   Temp: 96.8 °F (36 °C)   SpO2: 97%   Weight: 164 lb 9.6 oz (74.7 kg)   Height: 5' 7\" (1.702 m)     Estimated body mass index is 25.78 kg/m² as calculated from the following:    Height as of this encounter: 5' 7\" (1.702 m). Weight as of this encounter: 164 lb 9.6 oz (74.7 kg). Physical Exam  Vitals and nursing note reviewed. Constitutional:       General: She is not in acute distress. Appearance: Normal appearance. She is normal weight. Cardiovascular:      Rate and Rhythm: Normal rate and regular rhythm. Pulses: Normal pulses. Heart sounds: Normal heart sounds, S1 normal and S2 normal.   Pulmonary:      Effort: Pulmonary effort is normal.      Breath sounds: Normal breath sounds. Musculoskeletal:         General: Normal range of motion. Cervical back: Normal, normal range of motion and neck supple. Thoracic back: Normal.      Lumbar back: Tenderness (Lower midline) present. No swelling, edema, signs of trauma or spasms. Normal range of motion. Negative right straight leg raise test and negative left straight leg raise test.        Back:       Right lower leg: No edema. Left lower leg: No edema. Skin:     General: Skin is warm. Neurological:      General: No focal deficit present. Mental Status: She is alert and oriented to person, place, and time. Psychiatric:         Mood and Affect: Mood normal.         Behavior: Behavior normal.         ASSESSMENT/PLAN:  1.  Acute midline low back pain without sciatica  -Improving.  -Continue Medrol Dosepak.  -Continue Tylenol at 1000 mg every 6 hours as needed.  -Apply heat 3 times a day or more as needed  -Increase back exercises/stretches to 3 times daily  -Go to emergency room with urgent symptoms discussed.  -Send WorkTouch message if no improvement, then I will send a referral to [facility] back and spine; Patient is in agreement with this plan. Return if symptoms worsen or fail to improve. ER with emergency/urgent symptoms. Discussed use, benefit, and side effects of prescribed medications. Patient's questions answered and concerns addressed. Patient agrees to plan of care. My scheduled days in the office reviewed with patient, and same day appointments available. Encouraged to use [employer] for communication as needed. Electronically signed by ROGELIO Dixon CNP on 2/28/2022 at 7:54 PM       This dictation was generated by voice recognition computer software. Although all attempts are made to edit the dictation for accuracy, there may be errors in the transcription that are not intended.

## 2022-03-08 ENCOUNTER — OFFICE VISIT (OUTPATIENT)
Dept: ORTHOPEDIC SURGERY | Age: 52
End: 2022-03-08
Payer: COMMERCIAL

## 2022-03-08 VITALS — WEIGHT: 164 LBS | HEIGHT: 67 IN | BODY MASS INDEX: 25.74 KG/M2

## 2022-03-08 DIAGNOSIS — M54.50 LUMBAR PAIN: Primary | ICD-10-CM

## 2022-03-08 DIAGNOSIS — M51.36 DDD (DEGENERATIVE DISC DISEASE), LUMBAR: ICD-10-CM

## 2022-03-08 DIAGNOSIS — S39.012A LUMBAR STRAIN, INITIAL ENCOUNTER: ICD-10-CM

## 2022-03-08 PROCEDURE — 99203 OFFICE O/P NEW LOW 30 MIN: CPT | Performed by: PHYSICIAN ASSISTANT

## 2022-03-08 PROCEDURE — L0642 LO SAG RI AN/POS PNL PRE OTS: HCPCS | Performed by: PHYSICIAN ASSISTANT

## 2022-03-08 RX ORDER — METHYLPREDNISOLONE 4 MG/1
TABLET ORAL
Qty: 1 KIT | Refills: 0 | Status: SHIPPED
Start: 2022-03-08 | End: 2022-03-08 | Stop reason: CLARIF

## 2022-03-08 RX ORDER — HYDROCODONE BITARTRATE AND ACETAMINOPHEN 5; 325 MG/1; MG/1
1 TABLET ORAL EVERY 4 HOURS PRN
Qty: 18 TABLET | Refills: 0 | Status: SHIPPED
Start: 2022-03-08 | End: 2022-03-08 | Stop reason: CLARIF

## 2022-03-08 RX ORDER — PREDNISONE 10 MG/1
TABLET ORAL
Qty: 26 TABLET | Refills: 0 | Status: SHIPPED | OUTPATIENT
Start: 2022-03-08

## 2022-03-08 NOTE — TELEPHONE ENCOUNTER
1. Acute bilateral low back pain without sciatica  - Worsened  - Start HYDROcodone-acetaminophen (NORCO) 5-325 MG per tablet; Take 1 tablet by mouth every 4 hours as needed for Pain (back pain) for up to 3 days. Intended supply: 3 days. Take lowest dose possible to manage pain  Dispense: 18 tablet; Refill: 0  - Stop OTC Tylenol.   - Continue back exercises/strethches. - Ice and alternate with heat  - Rest    2. Muscle tightness  - Continue cyclobenzaprine (FLEXERIL) 5 MG tablet; Take 1 tablet by mouth 2 times daily as needed for Muscle spasms  Dispense: 10 tablet;  Refill: 0

## 2022-03-08 NOTE — PROGRESS NOTES
New Patient: Angi Arambula    3/8/2022     CHIEF COMPLAINT:    Chief Complaint   Patient presents with    New Patient     NP/LUMBAR REF BY ELISHA WESTON       HISTORY OF PRESENT ILLNESS:              The patient is a 49 y.o. female here for a 2-week history of constant aching midline to right low back pain. She states the last week of February she slipped on the steps and twisted. She denies falling or hitting her low back. Her symptoms are constant but increased with sitting or transition. She reports some relief with walking and resting. Conservative care includes MDP, Tylenol, Flexeril, HEP. She states initially she had some improvement with MDP but denies any significant improvement overall at this current time. She denies any lower extremity radiating pain. She denies any progressive numbness tingling or extremity weakness. Denies any recent bowel or bladder dysfunction or saddle anesthesia. She denies any recent fevers chills or infections. Past Medical History:   Diagnosis Date    Environmental and seasonal allergies       The pain assessment was noted & reviewed in the medical record today.      Current/Past Treatment:   · Physical Therapy: HEP  · Chiropractic:     · Injection:     Medications:            NSAIDS: Anaphylaxis to ibuprofen            Muscle relaxer:   Flexeril            Steriods:   MDP            Neuropathic medications:              Opioids:            Other: Tylenol  · Surgery/Consult:    Work Status/Functionality: Desk  Past Medical History: Medical history form was reviewed today & scanned into the media tab  Past Medical History:   Diagnosis Date    Environmental and seasonal allergies       Past Surgical History:     Past Surgical History:   Procedure Laterality Date    BREAST BIOPSY      WISDOM TOOTH EXTRACTION       Current Medications:     Current Outpatient Medications:     acetaminophen (TYLENOL) 500 MG tablet, Take 1,000 mg by mouth every 6 hours as needed for Pain Moderate (4-6), Disp: , Rfl:     calcium carbonate (ANTACID) 500 MG chewable tablet, Chew 2 tablets daily (1000 mg) by mouth daily, Disp: , Rfl:     vitamin D3 (CHOLECALCIFEROL) 25 MCG (1000 UT) TABS tablet, Take 1 tablet by mouth daily, Disp: , Rfl:     fluticasone (FLONASE) 50 MCG/ACT nasal spray, 1 spray by Each Nostril route daily, Disp: , Rfl:     Multiple Vitamins-Minerals (QC WOMENS DAILY MULTIVITAMIN) TABS, Take 1 tablet by mouth daily, Disp: , Rfl:     fexofenadine (ALLEGRA) 180 MG tablet, Take 180 mg by mouth, Disp: , Rfl:   Allergies:  Ibuprofen, Shellfish-derived products, and Sulfa antibiotics  Social History:    reports that she has never smoked. She has never used smokeless tobacco. She reports current alcohol use. She reports that she does not use drugs. Family History:   Family History   Problem Relation Age of Onset    Heart Disease Mother     Hypertension Mother     Heart Disease Father     Diabetes type 2  Father         Diagnosed between late 49s-early 62s    Kidney Disease Father         Renal Failure    Hypertension Father     No Known Problems Brother     No Known Problems Daughter     No Known Problems Daughter        REVIEW OF SYSTEMS: Full ROS reviewed & scanned into chart  CONSTITUTIONAL: Denies unexplained weight loss, fevers   SKIN: Denies active skin conditions   ENT: Denies dizziness, nosebleeds  RESPIRATORY: Denies current dyspnea, difficulty breathing  CARDIOVASCULAR: Denies chest pain   NEUROLOGICAL: Denies stroke, unsteady gait or progressive weakness  PSYCHOLOGICAL: Denies anxiety, depression   HEMATOLOGIC: Denies blood disorders, cancer  ENDOCRINE: Denies excessive thirst, urination, heat/cold  GI: Denies ulcer, nausea, vomiting, diarrhea   : Denies bowel or bladder incontinence       PHYSICAL EXAM:    Vitals: Height 5' 7\" (1.702 m), weight 164 lb (74.4 kg), not currently breastfeeding.   Pain score 8/10    GENERAL EXAM:  · General Apparence: Patient is adequately groomed with no evidence of malnutrition. · Orientation: The patient is oriented to time, place and person. · Mood & Affect:The patient's mood and affect are appropriate   · Lymphatic: The lymphatic examination bilaterally reveals all areas to be without enlargement or induration  · Sensation: Sensation is intact without deficit  · Coordination/Balance: Good coordination       LUMBAR/SACRAL EXAMINATION:  · Inspection: Local inspection shows no step-off or bruising. Lumbar alignment is normal.  Sagittal and Coronal balance is neutral.      · Palpation:   No evidence of tenderness at the midline. She points to the L5-S1 level is where pain is located there is no focal tenderness  · Range of Motion: Intact flexion mild to moderate loss extension  · Strength:   Strength testing is 5/5 in all muscle groups tested. · Special Tests:   Straight leg raise and crossed SLR negative. Leg length and pelvis level.  0 out of 5 Yusef's signs. · Skin: There are no rashes, ulcerations or lesions. · Reflexes: Reflexes are symmetrically 2+ at the patellar and ankle tendons. .  · Gait & station: Normal, unassisted  · Additional Examinations:   · RIGHT LOWER EXTREMITY: Inspection/examination of the right lower extremity does not show any tenderness, deformity or injury. Range of motion is full. There is no gross instability. There are no rashes, ulcerations or lesions. Strength and tone are normal.  · LEFT LOWER EXTREMITY:  Inspection/examination of the left lower extremity does not show any tenderness, deformity or injury. Range of motion is full. There is no gross instability. There are no rashes, ulcerations or lesions.   Strength and tone are normal.      Diagnostic Testin views lumbar spine 3/8/2022 L5-S1 DDD, probable L5 spondylolysis, overlying bowel artifact on AP    Labs reviewed 2021 hemoglobin A1c 5.2    PCP notes reviewed from 2022      Impression:  1) Acute discogenic back pain  2) L5-S1 DDD, L5 spondylolysis   3) NSAIDS allergy        Plan:   1) 2 views lumbar spine were obtained today and discussed in detail  2) PT for above  3) Prednisone taper--side effects discussed  4) Discussed kneeling chair and proper ergonomics  5) Quick draw brace PRN  6) F/u 4-6wks, L MRI WO if no improvement        Procedures    Breg Quick Draw Lumbar Brace     Patient was prescribed a Breg Quick Draw Lumbar Brace. The lumbar spine will require stabilization / immobilization from this semi-rigid / rigid orthosis to improve their function. The orthosis will assist in protecting the affected area, provide functional support and facilitate healing. This orthosis is required for the following reasons:    Reduce pain by restricting mobility of the trunk  Facilitate healing following an injury to the spine or related soft tissues  Support weak spinal muscles    The patient was educated and fit by a healthcare professional with expert knowledge and specialization in brace application while under the direct supervision of the physician. Verbal and written instructions for the use of and application of this item were provided. They were instructed to contact the office immediately should the brace result in increased pain, decreased sensation, increased swelling or worsening of the condition.          Nehemias Balderrama PA-C, MPAS  Board Certified by the [facility]

## 2022-03-09 ENCOUNTER — TELEPHONE (OUTPATIENT)
Dept: PRIMARY CARE CLINIC | Age: 52
End: 2022-03-09

## 2022-03-09 NOTE — TELEPHONE ENCOUNTER
----- Message from Jerry Cook sent at 3/7/2022 12:22 PM EST -----  Subject: Message to Provider    QUESTIONS  Information for Provider? Pt called to see what progress has been made   getting her an appt with a spine specialist. Please call pt with any info. Thank you.  ---------------------------------------------------------------------------  --------------  CALL BACK INFO  What is the best way for the office to contact you? OK to leave message on   voicemail  Preferred Call Back Phone Number? [phone]  ---------------------------------------------------------------------------  --------------  SCRIPT ANSWERS  Relationship to Patient?  Self

## 2022-03-09 NOTE — TELEPHONE ENCOUNTER
----- Message from Adebayo Mckenna sent at 3/7/2022 10:09 AM EST -----  Subject: Referral Request    QUESTIONS   Reason for referral request? Patient has been experiencing severe back   pain which is very uncomfortable and waiting to be referred to a   specialist.   Has the physician seen you for this condition before? Yes  Select a date? 2022-02-28  Select the Provider the patient wants to be referred to, if known (PCP or   Specialist)? [facility]   Preferred Specialist (if applicable)? Do you already have an appointment scheduled? Additional Information for Provider? Been waiting 2 weeks for referral   request to back and spine  ---------------------------------------------------------------------------  --------------  CALL BACK INFO  What is the best way for the office to contact you? OK to leave message on   voicemail  Preferred Call Back Phone Number?  [phone]

## 2022-03-09 NOTE — TELEPHONE ENCOUNTER
Called patient to follow up on back pain. Patient notified for name and date of birth at beginning of call. Sapna Suarez contacted [facility] herself, as we had discussed possible referral at her appointment at her appointment on 2/28/2022. - She was evaluated by Randy Ivy PA-C ([facility]) on 3/8/2022.  - Diagnosed with lumbar pain and degenerative disc disease on 2/8/2022. - She started new prednisone prescription this morning.  - Physical therapy evaluation scheduled for Monday, 4/13/2022. Patient stated she appreciated the follow-up call and is hopeful lumbar pain will improve with prednisone and physical therapy. She denied having any additional questions or concerns.

## 2022-03-14 ENCOUNTER — HOSPITAL ENCOUNTER (OUTPATIENT)
Dept: PHYSICAL THERAPY | Age: 52
Setting detail: THERAPIES SERIES
Discharge: HOME | End: 2022-03-14
Payer: COMMERCIAL

## 2022-03-14 PROCEDURE — 97110 THERAPEUTIC EXERCISES: CPT | Performed by: PHYSICAL THERAPIST

## 2022-03-14 PROCEDURE — 97530 THERAPEUTIC ACTIVITIES: CPT | Performed by: PHYSICAL THERAPIST

## 2022-03-14 PROCEDURE — 97161 PT EVAL LOW COMPLEX 20 MIN: CPT | Performed by: PHYSICAL THERAPIST

## 2022-03-14 NOTE — PLAN OF CARE
[facility]  Phone: [phone]  Fax: [phone]     Physical Therapy Certification    Dear Referring Practitioner: RAJESH Castillo,    We had the pleasure of evaluating the following patient for physical therapy services at [address] [place] [address]. A summary of our findings can be found in the initial assessment below. This includes our plan of care. If you have any questions or concerns regarding these findings, please do not hesitate to contact me at the office phone number checked above. Thank you for the referral.       Physician Signature:_______________________________Date:__________________  By signing above (or electronic signature), therapists plan is approved by physician      Patient: Laury Elmore   : 1970   MRN: [id number]  Referring Physician: Referring Practitioner: RAJESH Castillo      Evaluation Date: 3/14/2022      Medical Diagnosis Information:  Diagnosis: M54.50, M51.36, S39.012A  Lx pain, DDD, strain   Treatment Diagnosis: LBP with decreased flexibility and core strength                                         Insurance information: PT Insurance Information: Sara Josue     Precautions/ Contra-indications:     C-SSRS Triggered by Intake questionnaire (Past 2 wk assessment ):   [x] No, Questionnaire did not trigger screening.   [] Yes, Patient intake triggered C-SSRS Screening      [] C-SSRS Screening completed  [] PCP notified via Epic    Latex Allergy:  [x]NO      []YES  Preferred Language for Healthcare:   [x]English       []other:    SUBJECTIVE: Patient stated complaint: Patient reports onset of LBP (R>L) 2 weeks ago, then twisted on the stairs last week. 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 Patient was put on prednisone and has had reduced pain.  She was referred to PT by [facility]. 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     OBJECTIVE:     Palpation: no ttp  Functional Mobility/Transfers:WFLS    Posture: pelvic rotation R>L LLD (actual LL is = at 99 cm B)    Inspection: slight decrease in lx lordosis, mm guarding in gluts    Gait: (include devices/WB status) no AD, = step length, good heel toe pattern    Bandages/Dressings/Incisions: NA    Dermatomes Normal Abnormal Comments   inguinal area (L1)  X     anterior mid-thigh (L2) X     distal ant thigh/med knee (L3) X     medial lower leg and foot (L4) X     lateral lower leg and foot (L5) X     posterior calf (S1) X     medial calcaneus (S2) X         Reflexes Normal Abnormal Comments   S1-2 Seated achilles X     S1-2 Prone knee bend      L3-4 Patellar tendon X     Clonus      Babinski          ROM  Comments   Lumbar Flex Min restriction    Lumbar Ext Min-mod restriction      ROM LEFT RIGHT Comments   Lumbar Side Bend   =   Lumbar Rotation   =   Hip Flexion      Hip Abd      Hip ER      Hip IR      Hip Extension      Knee Ext      Knee Flex      Hamstring Flex -30 -35    Piriformis Min-mod mod                    Joint mobility: Lx spine   []Normal    [x]Hypo   []Hyper      Strength / Myotomes LEFT RIGHT Comments   Multifidus Fair+ Fair+    Transverse Ab Fair+ Fair+    Hip Flexors (L1-2) 4+ 4+    Hip Abductors 4 4    Hip Extensors 4 4    Hip Internal Rotators      Hip External Rotators Quads (L2-4) 5 5-    Hamstrings  4+ 4+    Ankle Plantarflexion (S1-2)      Ankle Dorsiflexion (L4-5) 5 5    Ankle Inversion      Ankle Eversion (S1-2)      Great Toe Extension (L5)          Neural dynamic tension testing Normal Abnormal Comments   Slump Test  - Degree of knee flexion:       SLR       0-30 x     30-70 x     Femoral nerve (L2-4) x         Orthopedic Special Tests:    Normal Abnormal N/A Comments   Toe walk   x      Heel Walk x      Fwd Bend-aberrant or innominate mvmt)  L     Trendelenburg x      Kemps/Quadrant       Stork       YESIKA/Cordell    Tightness B, no pain   Hip scour       SLR x      Crossed SLR       Supine to sit       Hip thrust       SI distraction/compression x      PA/Spring    Restrictions in Lx spine   Prone Instability test       Prone knee bend x      Sacral Spring/thrust x                   [x] Patient history, allergies, meds reviewed. Medical chart reviewed. See intake form. Review Of Systems (ROS):  [x]Performed Review of systems (Integumentary, CardioPulmonary, Neurological) by intake and observation. Intake form has been scanned into medical record. Patient has been instructed to contact their primary care physician regarding ROS issues if not already being addressed at this time.       Co-morbidities/Complexities (which will affect course of rehabilitation):   []None           Arthritic conditions   []Rheumatoid arthritis (M05.9)  []Osteoarthritis (M19.91)   Cardiovascular conditions   []Hypertension (I10)  []Hyperlipidemia (E78.5)  []Angina pectoris (I20)  []Atherosclerosis (I70)   Musculoskeletal conditions   []Disc pathology   []Congenital spine pathologies   [x]Prior surgical intervention  []Osteoporosis (M81.8)  []Osteopenia (M85.8)   Endocrine conditions   []Hypothyroid (E03.9)  []Hyperthyroid Gastrointestinal conditions   []Constipation (O75.52)   Metabolic conditions   []Morbid obesity (E66.01)  []Diabetes type 1(E10.65) or 2 (E11.65)   []Neuropathy (G60.9) Pulmonary conditions   []Asthma (J45)  []Coughing   []COPD (J44.9)   Psychological Disorders  []Anxiety (F41.9)  []Depression (F32.9)   []Other:   [x]Other:     L foot sx - 5th MT screws      Barriers to/and or personal factors that will affect rehab potential:              []Age  []Sex    []Smoker              []Motivation/Lack of Motivation                        []Co-Morbidities              []Cognitive Function, education/learning barriers              []Environmental, home barriers              [x]profession/work barriers  []past PT/medical experience  []other:  Justification:     Falls Risk Assessment (30 days):  [x] Falls Risk assessed and no intervention required.   [] Falls Risk assessed and Patient requires intervention due to being higher risk   TUG score (>12s at risk):     [] Falls education provided, including         ASSESSMENT:   Functional Impairments:     [x]Noted lumbar/proximal hip hypomobility   []Noted lumbosacral and/or generalized hypermobility   [x]Decreased Lumbosacral/hip/LE functional ROM   [x]Decreased core/proximal hip strength and neuromuscular control    []Decreased LE functional strength    []Abnormal reflexes/sensation/myotomal/dermatomal deficits  []Reduced balance/proprioceptive control    []other:      Functional Activity Limitations (from functional questionnaire and intake)   [x]Reduced ability to tolerate prolonged functional positions   []Reduced ability or difficulty with changes of positions or transfers between positions   [x]Reduced ability to maintain good posture and demonstrate good body mechanics with sitting, bending, and lifting   []Reduced ability to sleep   [] Reduced ability or tolerance with driving and/or computer work   []Reduced ability to perform lifting, reaching, carrying tasks   [x]Reduced ability to squat   []Reduced ability to forward bend   []Reduced ability to ambulate prolonged functional periods/distances/surfaces   []Reduced ability to ascend/descend stairs   []other:       Participation Restrictions   []Reduced participation in self care activities   [x]Reduced participation in home management activities   [x]Reduced participation in work activities   [x]Reduced participation in social activities. [x]Reduced participation in sport/recreational activities. Classification:   []Signs/symptoms consistent with Lumbar instability/stabilization subgroup. [x]Signs/symptoms consistent with Lumbar mobilization/manipulation subgroup, myotomes and dermatomes intact. Meets manipulation criteria. []Signs/symptoms consistent with Lumbar direction specific/centralization subgroup   []Signs/symptoms consistent with Lumbar traction subgroup     []Signs/symptoms consistent with lumbar facet dysfunction   []Signs/symptoms consistent with lumbar stenosis type dysfunction   []Signs/symptoms consistent with nerve root involvement including myotome & dermatome dysfunction   []Signs/symptoms consistent with post-surgical status including: decreased ROM, strength and function.    []signs/symptoms consistent with pathology which may benefit from Dry needling     []other:      Prognosis/Rehab Potential:      []Excellent   [x]Good    []Fair   []Poor    Tolerance of evaluation/treatment:    []Excellent   [x]Good    []Fair   []Poor     Physical Therapy Evaluation Complexity Justification  [x] A history of present problem with:  [x] no personal factors and/or comorbidities that impact the plan of care;  []1-2 personal factors and/or comorbidities that impact the plan of care  []3 personal factors and/or comorbidities that impact the plan of care  [x] An examination of body systems using standardized tests and measures addressing any of the following: body structures and functions (impairments), activity limitations, and/or participation restrictions;  [x] a total of 1-2 or more elements   [] a total of 3 or more elements   [] a total of 4 or more elements   [x] A clinical presentation with:  [x] stable and/or uncomplicated characteristics   [] evolving clinical presentation with changing characteristics  [] unstable and unpredictable characteristics;   [x] Clinical decision making of [x] low, [] moderate, [] high complexity using standardized patient assessment instrument and/or measurable assessment of functional outcome. [x] EVAL (LOW) 11941 (typically 15 minutes face-to-face)  [] EVAL (MOD) 08261 (typically 30 minutes face-to-face)  [] EVAL (HIGH) 50460 (typically 45 minutes face-to-face)  [] RE-EVAL     PLAN: Begin PT focusing on: proximal hip mobilizations, LB mobs, LB core activation, proximal hip activation, and HEP    Frequency/Duration: 1 days per week for 4 Weeks:  Interventions:  [x]  Therapeutic exercise including: strength training, ROM, for LE, Glutes and core   [x]  NMR activation and proprioception for glutes , LE and Core   [x]  Manual therapy as indicated for Hip complex, LE and spine to include: Dry Needling/IASTM, STM, PROM, Gr I-IV mobilizations, manipulation. [x]  Modalities as needed that may include: thermal agents, E-stim, Biofeedback, US, iontophoresis as indicated  [x]  Patient education on joint protection, postural re-education, activity modification, progression of HEP. HEP instruction: Written HEP instructions provided and reviewed     GOALS:  Patient stated goal: strengthen core  [] Progressing: [] Met: [] Not Met: [] Adjusted    Therapist goals for Patient:   Short Term Goals: To be achieved in: 2 weeks  1. Independent in HEP and progression per patient tolerance, in order to prevent re-injury. [] Progressing: [] Met: [] Not Met: [] Adjusted  2. Patient will have a decrease in pain to facilitate improvement in movement, function, and ADLs as indicated by Functional Deficits. [] Progressing: [] Met: [] Not Met: [] Adjusted    Long Term Goals: To be achieved in: 4-6 weeks  1.  Disability index score of 10% or less for the MERY to assist with reaching prior level of function. [] Progressing: [] Met: [] Not Met: [] Adjusted  2. Patient will demonstrate increased AROM to WNL, good LS mobility, good hip ROM to allow for proper joint functioning as indicated by patients Functional Deficits. [] Progressing: [] Met: [] Not Met: [] Adjusted  3. Patient will demonstrate an increase in Strength to good proximal hip and core activation to allow for proper functional mobility as indicated by patients Functional Deficits. [] Progressing: [] Met: [] Not Met: [] Adjusted  4. Patient will return to functional activities including sitting with work, household chores without increased symptoms or restriction. [] Progressing: [] Met: [] Not Met: [] Adjusted  5.  Patient will report 75% improvement in pain and function  [] Progressing: [] Met: [] Not Met: [] Adjusted     Electronically signed by:  Ron Lazo, PTMPT [id number]

## 2022-03-14 NOTE — FLOWSHEET NOTE
Sujit Corado  Phone: [phone]  Fax: [phone]    Physical Therapy Treatment Note/ Progress Report:     Date:  3/14/2022    Patient Name:  Dayanara Holloway    :  1970  MRN: [id number]  Restrictions/Precautions:    Medical/Treatment Diagnosis Information:  Diagnosis: M54.50, M51.36, S39.012A  Lx pain, DDD, strain  Treatment Diagnosis: LBP with decreased flexibility and core strength  Insurance/Certification information:  PT Insurance Information: [address] [place] [address]  Physician Information:  Referring Practitioner: Lorie Leyden, PA  Plan of care signed (Y/N): []  Yes  [x]  No     Date of Patient follow up with Physician:      Progress Report: []  Yes  [x]  No     Date Range for reporting period:  Beginning:3/14/22  Ending:     Progress report due (10 Rx/or 30 days whichever is less):      Recertification due (POC duration/ or 90 days whichever is less):      Visit # Insurance Allowable Auth required? Date Range   1 BOMN []  Yes  []  No PCY     Latex Allergy:  [x]NO      []YES  Preferred Language for Healthcare:   [x]English       []other:    Functional Scale:       Date assessed:  Oswestry: raw score = 73 ; dysfunction =9%  3/14/22    Pain level:  0/10 at present     SUBJECTIVE:  Patient reports onset of LBP (R>L) 2 weeks ago, then twisted on the stairs last week. 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 Patient was put on prednisone and has had reduced pain.  She was referred to PT by the [facility].       OBJECTIVE: See eval   Observation:    Test measurements:      RESTRICTIONS/PRECAUTIONS:     Exercises/Interventions:     Therapeutic Exercise (83073) Resistance / level Sets / Seconds Reps Notes / Cues   TM       IB       HSS       Tball squat  Tball nbos sit  Tball opp/UE/LE                                   HEP:  HSS  Piriformis str supine & sit  TB clam  TB bridge  Hand heel rock  SL hip abd  SL LE circles  Q-ped  Plank  Side plank  90/90 hold       blue    3x30\"  2x30\"  10 L/R  10  5x    Therapeutic Activities (44491)       CC: mid row  CC: multif  CC: hip abd, ext        Educated on office ergonomics, walking at lunch, need to stretch after exercise, postural awreness                           Neuromuscular Re-ed (89748)                                          Manual Intervention (91815)       Lx spine Prone PA  MET for pelvic rotation   R hip flex, L hip ext, F/B shotgun  10x  3x    Manual str                                                          Pt. Education:  -pt educated on diagnosis, prognosis and expectations for rehab  -all pt questions were answered    Home Exercise Program:  Access Code: [email or web address]  URL: [email or web address]  Date: 03/14/2022  Prepared by: Edsel Rubinstein    Exercises  Clamshell with Resistance - 1 x daily - 7 x weekly - 2 sets - 10 reps  Bridge with Hip Abduction and Resistance - 1 x daily - 7 x weekly - 2 sets - 10 reps  Supine Piriformis Stretch with Foot on Ground - 2 x daily - 7 x weekly - 1 sets - 2-3 reps - 30 hold  Seated Piriformis Stretch - 1 x daily - 7 x weekly - 1 sets - 2 reps - 30 hold  Quadruped Rock Back into [employer] Up - 1 x daily - 7 x weekly - 1 sets - 5-10 reps  Hamstring stretch on steps - 1 x daily - 7 x weekly - 1 sets - 3 reps - 30 hold    Therapeutic Exercise and NMR EXR  [x] (86006) Provided verbal/tactile cueing for activities related to strengthening, flexibility, endurance, ROM  for improvements in proximal hip and core control with self care, mobility, lifting and ambulation.  [] (52592) Provided verbal/tactile cueing for activities related to improving balance, coordination, kinesthetic sense, posture, motor skill, proprioception  to assist with core control in self care, mobility, lifting, and ambulation.      Therapeutic Activities:    [x] (00220 or 93905) Provided verbal/tactile cueing for activities related to improving balance, coordination, kinesthetic sense, posture, motor skill, proprioception and motor activation to allow for proper function  with self care and ADLs  [] (44307) Provided training and instruction to the patient for proper core and proximal hip recruitment and positioning with ambulation re-education     Home Exercise Program:    [x] (26305) Reviewed/Progressed HEP activities related to strengthening, flexibility, endurance, ROM of core, proximal hip and LE for functional self-care, mobility, lifting and ambulation   [] (42532) Reviewed/Progressed HEP activities related to improving balance, coordination, kinesthetic sense, posture, motor skill, proprioception of core, proximal hip and LE for self care, mobility, lifting, and ambulation      Manual Treatments:  PROM / STM / Oscillations-Mobs:  G-I, II, III, IV (PA's, Inf., Post.)  [] (06408) Provided manual therapy to mobilize proximal hip and LS spine soft tissue/joints for the purpose of modulating pain, promoting relaxation,  increasing ROM, reducing/eliminating soft tissue swelling/inflammation/restriction, improving soft tissue extensibility and allowing for proper ROM for normal function with self care, mobility, lifting and ambulation. Modalities:    prn    Charges:  Timed Code Treatment Minutes: 30   Total Treatment Minutes: 45       [x] EVAL - LOW (95353)   [] EVAL - MOD (49628)  [] EVAL - HIGH (87732)  [] RE-EVAL (57980)  [x] TE (16607) x  1    [] Ionto (91366)  [] NMR (81927) x      [] Vaso (19544)  [] Manual (28736) x      [] Ultrasound  [x] TA (10600) x1      [] Mech Traction (95576)  [] Gait Training x     [] ES (un) (91539):   [] Aquatic therapy x   [] Other:   [] Group:       Goals:  Patient stated goal: strengthen core  []? Progressing: []? Met: []? Not Met: []? Adjusted     Therapist goals for Patient:   Short Term Goals: To be achieved in: 2 weeks  1. Independent in HEP and progression per patient tolerance, in order to prevent re-injury. []? Progressing: []?  Met: []? Not Met: []? Adjusted  2. Patient will have a decrease in pain to facilitate improvement in movement, function, and ADLs as indicated by Functional Deficits. []? Progressing: []? Met: []? Not Met: []? Adjusted     Long Term Goals: To be achieved in: 4-6 weeks  1. Disability index score of 10% or less for the MERY to assist with reaching prior level of function. []? Progressing: []? Met: []? Not Met: []? Adjusted  2. Patient will demonstrate increased AROM to WNL, good LS mobility, good hip ROM to allow for proper joint functioning as indicated by patients Functional Deficits. []? Progressing: []? Met: []? Not Met: []? Adjusted  3. Patient will demonstrate an increase in Strength to good proximal hip and core activation to allow for proper functional mobility as indicated by patients Functional Deficits. []? Progressing: []? Met: []? Not Met: []? Adjusted  4. Patient will return to functional activities including sitting with work, household chores without increased symptoms or restriction. []? Progressing: []? Met: []? Not Met: []? Adjusted  5. Patient will report 75% improvement in pain and function  []? Progressing: []? Met: []? Not Met: []? Adjusted        Overall Progression Towards Functional goals/ Treatment Progress Update:  [] Patient is progressing as expected towards functional goals listed. [] Progression is slowed due to complexities/Impairments listed. [] Progression has been slowed due to co-morbidities.   [x] Plan just implemented, too soon to assess goals progression <30days   [] Goals require adjustment due to lack of progress  [] Patient is not progressing as expected and requires additional follow up with physician  [] Other    Persisting Functional Limitations/Impairments:  [x]Sitting []Standing   []Walking [x]Squatting/bending    []Stairs []ADL's    []Transfers []Reaching  []Housework []Job related tasks  []Driving []Sports/Recreation   []Sleeping []Other:    ASSESSMENT:  See eval  Treatment/Activity Tolerance:  [x] Patient able to complete tx  [] Patient limited by fatique  [] Patient limited by pain  [] Patient limited by other medical complications  [] Other:     Prognosis: [x] Good [] Fair  [] Poor    Patient Requires Follow-up: [x] Yes  [] No    Plan for next treatment session: progress strengthening in HEP    PLAN: See eval. PT 1 x / week for 4 weeks. [] Continue per plan of care [] Alter current plan (see comments)  [x] Plan of care initiated [] Hold pending MD visit [] Discharge    Electronically signed by: Candy Malave, PT MPT [id number]      Note: If patient does not return for scheduled/ recommended follow up visits, this note will serve as a discharge from care along with most recent update on progress.

## 2022-03-21 ENCOUNTER — APPOINTMENT (OUTPATIENT)
Dept: PHYSICAL THERAPY | Age: 52
End: 2022-03-21
Payer: COMMERCIAL

## 2022-03-22 ENCOUNTER — HOSPITAL ENCOUNTER (OUTPATIENT)
Dept: PHYSICAL THERAPY | Age: 52
Setting detail: THERAPIES SERIES
Discharge: HOME | End: 2022-03-22
Payer: COMMERCIAL

## 2022-03-22 PROCEDURE — 97110 THERAPEUTIC EXERCISES: CPT | Performed by: PHYSICAL THERAPIST

## 2022-03-22 PROCEDURE — 97140 MANUAL THERAPY 1/> REGIONS: CPT | Performed by: PHYSICAL THERAPIST

## 2022-03-22 NOTE — FLOWSHEET NOTE
Sujit Coraod  Phone: [phone]  Fax: [phone]    Physical Therapy Treatment Note/ Progress Report:     Date:  3/22/2022    Patient Name:  Adenike Harden    :  1970  MRN: [id number]  Restrictions/Precautions:    Medical/Treatment Diagnosis Information:  Diagnosis: M54.50, M51.36, S39.012A  Lx pain, DDD, strain  Treatment Diagnosis: LBP with decreased flexibility and core strength  Insurance/Certification information:  PT Insurance Information: [address] [place] [address]  Physician Information:  Referring Practitioner: RAJESH Moreno  Plan of care signed (Y/N): []  Yes  [x]  No     Date of Patient follow up with Physician:      Progress Report: []  Yes  [x]  No     Date Range for reporting period:  Beginning:3/14/22  Ending:     Progress report due (10 Rx/or 30 days whichever is less):      Recertification due (POC duration/ or 90 days whichever is less):      Visit # Insurance Allowable Auth required? Date Range   2 BOMN []  Yes  []  No PCY     Latex Allergy:  [x]NO      []YES  Preferred Language for Healthcare:   [x]English       []other:    Functional Scale:       Date assessed:  Oswestry: raw score = 73 ; dysfunction =9%  3/14/22    Pain level:  210 at present     History:  Patient reports onset of LBP (R>L) 2 weeks ago, then twisted on the stairs last week. 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 Patient was put on prednisone and has had reduced pain. She was referred to PT by the [facility].       SUBJECTIVE:  Pt is now off all meds. She notes an ache in central LB, doug after sitting. She is now walking after work and attempting to take breaks during the day to get up from her chair.     OBJECTIVE: See eval   Observation:    Test measurements:      RESTRICTIONS/PRECAUTIONS:     Exercises/Interventions:     Therapeutic Exercise (21498) Resistance / level Sets / Seconds Reps Notes / Cues   TM 2.0mph  5'    IB   2x30\"    HSS   2x30\" B Tball squat  Tball nbos sit  Tball opp/UE/LE  Tball ab crunch grn ball  15  30\"  10 L/R           TB multif   add                  Mat Ex  HSS  Piriformis str supine & sit  TB clam  TB bridge  Hand heel rock  SL hip abd  SL LE circles  SL hip flex/ext  Q-ped opp UE/LE  Plank  Side plank  90/90 hold  High plank to star       blue    5x  10x L/R  10x ea L/R  10x L/R  10x L/R  Add  Add  add    Therapeutic Activities (00584)       CC: mid row  CC: multif  CC: hip abd, ext        Educated on office ergonomics, walking at lunch, need to stretch after exercise, postural awreness                           Neuromuscular Re-ed (81781)                                          Manual Intervention (18572)       Lx spine Prone PA  MET for pelvic rotation   R hip flex, L hip ext, F/B shotgun       3/22: good alignment this date   B LE Manual str: HS, glut, hip flex  roller to R glut  PA mobs     12'                                                       Pt. Education:  -pt educated on diagnosis, prognosis and expectations for rehab  -all pt questions were answered    Home Exercise Program:  Access Code: [email or web address]  URL: [email or web address]  Date: 03/14/2022  Prepared by: Leonor Arciniega    Exercises  Clamshell with Resistance - 1 x daily - 7 x weekly - 2 sets - 10 reps  Bridge with Hip Abduction and Resistance - 1 x daily - 7 x weekly - 2 sets - 10 reps  Supine Piriformis Stretch with Foot on Ground - 2 x daily - 7 x weekly - 1 sets - 2-3 reps - 30 hold  Seated Piriformis Stretch - 1 x daily - 7 x weekly - 1 sets - 2 reps - 30 hold  Quadruped Rock Back into Prone Press Up - 1 x daily - 7 x weekly - 1 sets - 5-10 reps  Hamstring stretch on steps - 1 x daily - 7 x weekly - 1 sets - 3 reps - 30 hold    Therapeutic Exercise and NMR EXR  [x] (97520) Provided verbal/tactile cueing for activities related to strengthening, flexibility, endurance, ROM  for improvements in proximal hip and core control with self care, mobility, lifting and ambulation.  [] (93609) Provided verbal/tactile cueing for activities related to improving balance, coordination, kinesthetic sense, posture, motor skill, proprioception  to assist with core control in self care, mobility, lifting, and ambulation. Therapeutic Activities:    [] (09552 or 64514) Provided verbal/tactile cueing for activities related to improving balance, coordination, kinesthetic sense, posture, motor skill, proprioception and motor activation to allow for proper function  with self care and ADLs  [] (92305) Provided training and instruction to the patient for proper core and proximal hip recruitment and positioning with ambulation re-education     Home Exercise Program:    [x] (19960) Reviewed/Progressed HEP activities related to strengthening, flexibility, endurance, ROM of core, proximal hip and LE for functional self-care, mobility, lifting and ambulation   [] (92961) Reviewed/Progressed HEP activities related to improving balance, coordination, kinesthetic sense, posture, motor skill, proprioception of core, proximal hip and LE for self care, mobility, lifting, and ambulation      Manual Treatments:  PROM / STM / Oscillations-Mobs:  G-I, II, III, IV (PA's, Inf., Post.)  [] (95336) Provided manual therapy to mobilize proximal hip and LS spine soft tissue/joints for the purpose of modulating pain, promoting relaxation,  increasing ROM, reducing/eliminating soft tissue swelling/inflammation/restriction, improving soft tissue extensibility and allowing for proper ROM for normal function with self care, mobility, lifting and ambulation.      Modalities:    prn    Charges:  Timed Code Treatment Minutes: 40   Total Treatment Minutes: 40       [] EVAL - LOW (11442)   [] EVAL - MOD (06946)  [] EVAL - HIGH (26092)  [] RE-EVAL (47195)  [x] TE (87231) x  2    [] Ionto (91545)  [] NMR (36280) x      [] Vaso (76284)  [x] Manual (82687) x 1     [] Ultrasound  [] TA (22454) x1      [] [facility] Traction (68271)  [] Gait Training x     [] ES (un) (79229):   [] Aquatic therapy x   [] Other:   [] Group:     Goals:  Patient stated goal: strengthen core  []? Progressing: []? Met: []? Not Met: []? Adjusted     Therapist goals for Patient:   Short Term Goals: To be achieved in: 2 weeks  1. Independent in HEP and progression per patient tolerance, in order to prevent re-injury. [x]? Progressing: []? Met: []? Not Met: []? Adjusted  2. Patient will have a decrease in pain to facilitate improvement in movement, function, and ADLs as indicated by Functional Deficits. [x]? Progressing: []? Met: []? Not Met: []? Adjusted     Long Term Goals: To be achieved in: 4-6 weeks  1. Disability index score of 10% or less for the MERY to assist with reaching prior level of function. [x]? Progressing: []? Met: []? Not Met: []? Adjusted  2. Patient will demonstrate increased AROM to WNL, good LS mobility, good hip ROM to allow for proper joint functioning as indicated by patients Functional Deficits. [x]? Progressing: []? Met: []? Not Met: []? Adjusted  3. Patient will demonstrate an increase in Strength to good proximal hip and core activation to allow for proper functional mobility as indicated by patients Functional Deficits. [x]? Progressing: []? Met: []? Not Met: []? Adjusted  4. Patient will return to functional activities including sitting with work, household chores without increased symptoms or restriction. [x]? Progressing: []? Met: []? Not Met: []? Adjusted  5. Patient will report 75% improvement in pain and function  [x]? Progressing: []? Met: []? Not Met: []? Adjusted        Overall Progression Towards Functional goals/ Treatment Progress Update:  [] Patient is progressing as expected towards functional goals listed. [] Progression is slowed due to complexities/Impairments listed. [] Progression has been slowed due to co-morbidities.   [x] Plan just implemented, too soon to assess goals progression <30days [] Goals require adjustment due to lack of progress  [] Patient is not progressing as expected and requires additional follow up with physician  [] Other    Persisting Functional Limitations/Impairments:  [x]Sitting []Standing   []Walking [x]Squatting/bending    []Stairs []ADL's    []Transfers []Reaching  []Housework []Job related tasks  []Driving []Sports/Recreation   []Sleeping []Other:    ASSESSMENT:  Pt was able to tolerate hip and core strengthening. She was tired after hip series. Pt is tighter on R side. Will cont with HEP for 2 weeks then come back for 1 more F/U. Treatment/Activity Tolerance:  [x] Patient able to complete tx  [] Patient limited by fatique  [] Patient limited by pain  [] Patient limited by other medical complications  [] Other:     Prognosis: [x] Good [] Fair  [] Poor    Patient Requires Follow-up: [x] Yes  [] No    Plan for next treatment session: progress strengthening in HEP    PLAN: See eval. PT 1 x / week for 4 weeks. [x] Continue per plan of care [] Alter current plan (see comments)  [] Plan of care initiated [] Hold pending MD visit [] Discharge    Electronically signed by: Agata Moreno, PT MPT [id number]      Note: If patient does not return for scheduled/ recommended follow up visits, this note will serve as a discharge from care along with most recent update on progress.

## 2022-04-04 ENCOUNTER — HOSPITAL ENCOUNTER (OUTPATIENT)
Dept: PHYSICAL THERAPY | Age: 52
Setting detail: THERAPIES SERIES
Discharge: HOME | End: 2022-04-04

## 2022-04-04 NOTE — PROGRESS NOTES
[facility]    Physical Therapy  Cancellation/No-show Note  Patient Name:  Shelly Mahan  :  1970   Date:  2022    Cancelled visits to date: 1  No-shows to date: 0    For today's appointment patient:  [x]  Cancelled   []  Rescheduled appointment  []  No-show     Reason given by patient:  []  Patient ill  [x]  Conflicting appointment  []  No transportation    []  Conflict with work  []  No reason given  []  Other:     Comments:      Phone call information:   []  Phone call made today to patient at _ time at number provided:      []  Patient answered, conversation as follows:    []  Patient did not answer, message left as follows:  []  Phone call not made today  [x]  Phone call not needed - pt contacted us to cancel and provided reason for cancellation.      Electronically signed by:  Linn Henriquez, PT, MPT

## 2022-04-13 ENCOUNTER — OFFICE VISIT (OUTPATIENT)
Dept: ORTHOPEDIC SURGERY | Age: 52
End: 2022-04-13
Payer: COMMERCIAL

## 2022-04-13 VITALS — HEIGHT: 67 IN | WEIGHT: 164 LBS | BODY MASS INDEX: 25.74 KG/M2

## 2022-04-13 DIAGNOSIS — M54.50 LUMBAR PAIN: Primary | ICD-10-CM

## 2022-04-13 DIAGNOSIS — M51.36 DDD (DEGENERATIVE DISC DISEASE), LUMBAR: ICD-10-CM

## 2022-04-13 PROCEDURE — 99214 OFFICE O/P EST MOD 30 MIN: CPT | Performed by: PHYSICIAN ASSISTANT

## 2022-04-13 NOTE — PROGRESS NOTES
FOLLOW UP: SPINE    4/13/2022     CHIEF COMPLAINT:    Chief Complaint   Patient presents with    Follow-up     F/U LUMBAR        HISTORY OF PRESENT ILLNESS:              The patient is a 49 y.o. female here to follow-up after physical therapy and pharmacologic care for a nearly 2-month history of constant aching midline to right low back pain. She states in February she slipped on the steps and twisted. She denies falling or hitting her low back. Her symptoms are constant but increased with sitting or transition. She reports some relief with walking and resting. Conservative care includes: Physical therapy, prednisone, MDP, Tylenol, Flexeril, HEP. She states initially she had some improvement with oral steroids but denies any significant improvement overall at this current time. Feels her symptoms are worsening. She denies any lower extremity radiating pain. She denies any progressive numbness tingling or extremity weakness. Denies any recent bowel or bladder dysfunction or saddle anesthesia. She denies any recent fevers chills or infections. Past Medical History:   Diagnosis Date    Environmental and seasonal allergies       The pain assessment was noted & reviewed in the medical record today.      Current/Past Treatment:   · Physical Therapy: Yes and continued HEP without benefit  · Chiropractic:     · Injection:     Medications:            NSAIDS: Anaphylaxis to ibuprofen            Muscle relaxer:   Flexeril            Steriods:   MDP            Neuropathic medications:              Opioids: Norco            Other: Tylenol  · Surgery/Consult:    Work Status/Functionality: Desk    Past Medical History: Medical history form was reviewed today & scanned into the media tab  Past Medical History:   Diagnosis Date    Environmental and seasonal allergies       Past Surgical History:     Past Surgical History:   Procedure Laterality Date    BREAST BIOPSY      WISDOM TOOTH EXTRACTION       Current Medications:     Current Outpatient Medications:     predniSONE (DELTASONE) 10 MG tablet, SIG: iii po BID x 2 days then ii po BID x 2 days then i po BID x 2 days then i po qd x 2 days, Disp: 26 tablet, Rfl: 0    acetaminophen (TYLENOL) 500 MG tablet, Take 1,000 mg by mouth every 6 hours as needed for Pain Moderate (4-6), Disp: , Rfl:     calcium carbonate (ANTACID) 500 MG chewable tablet, Chew 2 tablets daily (1000 mg) by mouth daily, Disp: , Rfl:     vitamin D3 (CHOLECALCIFEROL) 25 MCG (1000 UT) TABS tablet, Take 1 tablet by mouth daily, Disp: , Rfl:     fluticasone (FLONASE) 50 MCG/ACT nasal spray, 1 spray by Each Nostril route daily, Disp: , Rfl:     Multiple Vitamins-Minerals (QC WOMENS DAILY MULTIVITAMIN) TABS, Take 1 tablet by mouth daily, Disp: , Rfl:     fexofenadine (ALLEGRA) 180 MG tablet, Take 180 mg by mouth, Disp: , Rfl:   Allergies:  Ibuprofen, Shellfish-derived products, and Sulfa antibiotics  Social History:    reports that she has never smoked. She has never used smokeless tobacco. She reports current alcohol use. She reports that she does not use drugs. Family History:   Family History   Problem Relation Age of Onset    Heart Disease Mother     Hypertension Mother     Heart Disease Father     Diabetes type 2  Father         Diagnosed between late 49s-early 62s    Kidney Disease Father         Renal Failure    Hypertension Father     No Known Problems Brother     No Known Problems Daughter     No Known Problems Daughter        REVIEW OF SYSTEMS: Full ROS reviewed & scanned into chart  CONSTITUTIONAL: Denies unexplained weight loss, fevers   SKIN: Denies active skin conditions     PHYSICAL EXAM:    Vitals: Height 5' 7\" (1.702 m), weight 164 lb (74.4 kg), not currently breastfeeding. Pain score 6/10    GENERAL EXAM:  · General Apparence: Patient is adequately groomed with no evidence of malnutrition. · Orientation: The patient is oriented to time, place and person.    · Mood & Affect:The patient's mood and affect are appropriate   · Lymphatic: The lymphatic examination bilaterally reveals all areas to be without enlargement or induration  · Sensation: Sensation is intact without deficit  · Coordination/Balance: Good coordination     LUMBAR/SACRAL EXAMINATION:  · Inspection: Local inspection shows no step-off or bruising. Lumbar alignment is normal.  Sagittal and Coronal balance is neutral.      · Palpation:   No evidence of tenderness at the midline. She points to the L5-S1 level is where pain is located there is no focal tenderness  · Range of Motion: Intact flexion mild to moderate loss extension--more pain with EXT  · Strength:   Strength testing is 5/5 in all muscle groups tested. · Special Tests:   Straight leg raise and crossed SLR negative. Leg length and pelvis level.  0 out of 5 Yusef's signs. · Skin: There are no rashes, ulcerations or lesions. · Reflexes: Reflexes are symmetrically 2+ at the patellar and ankle tendons. .  · Gait & station: Normal, unassisted  · Additional Examinations:   · RIGHT LOWER EXTREMITY: Inspection/examination of the right lower extremity does not show any tenderness, deformity or injury. Range of motion is full. There is no gross instability. There are no rashes, ulcerations or lesions. Strength and tone are normal.  · LEFT LOWER EXTREMITY:  Inspection/examination of the left lower extremity does not show any tenderness, deformity or injury. Range of motion is full. There is no gross instability. There are no rashes, ulcerations or lesions.   Strength and tone are normal.      Diagnostic Testing:    PT notes reviewed    2 views lumbar spine 3/8/2022 L5-S1 DDD, probable L5 spondylolysis, overlying bowel artifact on AP    Labs reviewed December 2021 hemoglobin A1c 5.2    PCP notes reviewed from 2/28/2022        Impression:  1) Acute discogenic back pain  2) L5-S1 DDD, L5 spondylolysis   3) NSAIDS allergy        Plan:   1) Lumbar MRI WO--she has failed recent PT, pharmacologic care and bracing  2) Cont Flexeril PRN--she is unable to take NSAIDs  3) Cont brace PRN  4) We briefly discussed considering LESI if warranted  5) F/u to review L SHILA Quiroz PA-C, MPAS  Board Certified by the [facility] [address]

## 2022-04-14 ENCOUNTER — TELEPHONE (OUTPATIENT)
Dept: ORTHOPEDIC SURGERY | Age: 52
End: 2022-04-14

## 2022-04-14 NOTE — TELEPHONE ENCOUNTER
Called & spoke with the patient informing her that her MRI was approved and everything was faxed over to [facility] AT [facility] on [address]. Patient was informed that I received a fax confirmation on her MRI and she can either call [employer] or they may get into contact with her, once that MRI is scheduled and completed, she may call us to schedule a f/u appointment to go other the MRI. Patient voiced understanding.

## 2022-05-04 ENCOUNTER — TELEPHONE (OUTPATIENT)
Dept: ORTHOPEDIC SURGERY | Age: 52
End: 2022-05-04

## 2022-05-04 NOTE — TELEPHONE ENCOUNTER
Called & left a voicemail for the patient informing her I was calling her to schedule a f/u appointment to go over her results with Mahesh Sarabia PA-C. She may call back to schedule at [phone].

## 2022-05-18 ENCOUNTER — OFFICE VISIT (OUTPATIENT)
Dept: ORTHOPEDIC SURGERY | Age: 52
End: 2022-05-18
Payer: COMMERCIAL

## 2022-05-18 VITALS — BODY MASS INDEX: 25.74 KG/M2 | HEIGHT: 67 IN | WEIGHT: 164 LBS

## 2022-05-18 DIAGNOSIS — M54.50 LUMBAR PAIN: Primary | ICD-10-CM

## 2022-05-18 DIAGNOSIS — M51.36 DDD (DEGENERATIVE DISC DISEASE), LUMBAR: ICD-10-CM

## 2022-05-18 PROCEDURE — 99214 OFFICE O/P EST MOD 30 MIN: CPT | Performed by: PHYSICIAN ASSISTANT

## 2022-05-19 ENCOUNTER — TELEPHONE (OUTPATIENT)
Dept: ORTHOPEDIC SURGERY | Age: 52
End: 2022-05-19

## 2022-05-19 NOTE — TELEPHONE ENCOUNTER
Notified [address] [place] [address] -     Medical records for 2/1/2022 to current for patient  at [address] [place] [address]. Images on cd to include with the records.

## 2022-05-19 NOTE — TELEPHONE ENCOUNTER
Made CD, Called and left the patient a message letting her know that she can pick her records/CD up at the [facility] Office.

## 2022-06-01 ENCOUNTER — TELEPHONE (OUTPATIENT)
Dept: PRIMARY CARE CLINIC | Age: 52
End: 2022-06-01

## 2022-06-01 DIAGNOSIS — N64.89 BREAST ASYMMETRY: ICD-10-CM

## 2022-06-01 DIAGNOSIS — Z12.31 BREAST CANCER SCREENING BY MAMMOGRAM: ICD-10-CM

## 2022-06-01 DIAGNOSIS — N60.01 BENIGN CYST OF RIGHT BREAST: Primary | ICD-10-CM

## 2022-06-01 NOTE — TELEPHONE ENCOUNTER
[facility] mammography called and the patient is due for a Bilateral diagnostic mammogram. They will need a new order faxed to [phone]. The patient is scheduled for 6/2/22 @ 7:30am with the original order that was placed.

## 2022-06-01 NOTE — TELEPHONE ENCOUNTER
1. Benign cyst of right breast  - [facility] RIGHT; Future    2. Breast cancer screening by mammogram  - [facility] LEFT; Future    3. Breast asymmetry  - [facility] RIGHT; Future    - Orders faxed to number provided with confirmation received- scanned into media.

## 2022-06-02 ENCOUNTER — HOSPITAL ENCOUNTER (OUTPATIENT)
Dept: WOMENS IMAGING | Age: 52
Discharge: HOME | End: 2022-06-02
Payer: COMMERCIAL

## 2022-06-02 ENCOUNTER — HOSPITAL ENCOUNTER (OUTPATIENT)
Dept: ULTRASOUND IMAGING | Age: 52
End: 2022-06-02
Payer: COMMERCIAL

## 2022-06-02 VITALS — BODY MASS INDEX: 24.33 KG/M2 | WEIGHT: 155 LBS | HEIGHT: 67 IN

## 2022-06-02 DIAGNOSIS — N64.89 BREAST ASYMMETRY: ICD-10-CM

## 2022-06-02 PROCEDURE — G0279 TOMOSYNTHESIS, MAMMO: HCPCS

## 2023-07-24 NOTE — PROGRESS NOTES
FOLLOW UP: SPINE    5/18/2022     CHIEF COMPLAINT: Low back pain follow-up MRI    HISTORY OF PRESENT ILLNESS:              The patient is a 49 y.o. female here to follow-up after physical therapy and pharmacologic care to review lumbar MRI for a 3-month history of constant aching midline to right low back pain. She states in February she slipped on the steps and twisted. She denies falling or hitting her low back. Her symptoms are constant but increased with sitting or transition. She reports some relief with walking and resting. Conservative care includes: Physical therapy, prednisone, MDP, Tylenol, Flexeril, HEP. She states initially she had some improvement with oral steroids but denies any significant improvement overall at this current time. Feels her symptoms are worsening. She denies any lower extremity radiating pain. She denies any progressive numbness tingling or extremity weakness. Denies any recent bowel or bladder dysfunction or saddle anesthesia. She denies any recent fevers chills or infections. Past Medical History:   Diagnosis Date    Environmental and seasonal allergies       The pain assessment was noted & reviewed in the medical record today.      Current/Past Treatment:   · Physical Therapy: Yes and continued HEP without benefit  · Chiropractic:     · Injection:     Medications:            NSAIDS: Anaphylaxis to ibuprofen            Muscle relaxer:   Flexeril            Steriods:   MDP            Neuropathic medications:              Opioids: Norco            Other: Tylenol  · Surgery/Consult:    Work Status/Functionality: Desk    Past Medical History: Medical history form was reviewed today & scanned into the media tab  Past Medical History:   Diagnosis Date    Environmental and seasonal allergies       Past Surgical History:     Past Surgical History:   Procedure Laterality Date    BREAST BIOPSY      WISDOM TOOTH EXTRACTION       Current Medications:     Current Outpatient Medications:     predniSONE (DELTASONE) 10 MG tablet, SIG: iii po BID x 2 days then ii po BID x 2 days then i po BID x 2 days then i po qd x 2 days, Disp: 26 tablet, Rfl: 0    acetaminophen (TYLENOL) 500 MG tablet, Take 1,000 mg by mouth every 6 hours as needed for Pain Moderate (4-6), Disp: , Rfl:     calcium carbonate (ANTACID) 500 MG chewable tablet, Chew 2 tablets daily (1000 mg) by mouth daily, Disp: , Rfl:     vitamin D3 (CHOLECALCIFEROL) 25 MCG (1000 UT) TABS tablet, Take 1 tablet by mouth daily, Disp: , Rfl:     fluticasone (FLONASE) 50 MCG/ACT nasal spray, 1 spray by Each Nostril route daily, Disp: , Rfl:     Multiple Vitamins-Minerals (QC WOMENS DAILY MULTIVITAMIN) TABS, Take 1 tablet by mouth daily, Disp: , Rfl:     fexofenadine (ALLEGRA) 180 MG tablet, Take 180 mg by mouth, Disp: , Rfl:   Allergies:  Ibuprofen, Shellfish-derived products, and Sulfa antibiotics  Social History:    reports that she has never smoked. She has never used smokeless tobacco. She reports current alcohol use. She reports that she does not use drugs. Family History:   Family History   Problem Relation Age of Onset    Heart Disease Mother     Hypertension Mother     Heart Disease Father     Diabetes type 2  Father         Diagnosed between late 49s-early 62s    Kidney Disease Father         Renal Failure    Hypertension Father     No Known Problems Brother     No Known Problems Daughter     No Known Problems Daughter        REVIEW OF SYSTEMS: Full ROS reviewed & scanned into chart  CONSTITUTIONAL: Denies unexplained weight loss, fevers   SKIN: Denies active skin conditions     PHYSICAL EXAM:    Vitals: not currently breastfeeding. 5/18/22 3:50 PM      Weight  164 lb (74.4 kg)     Height  5' 7\" (1.702 m)     Pain Score    4     Pain Loc  Back          GENERAL EXAM:  · General Apparence: Patient is adequately groomed with no evidence of malnutrition.   · Orientation: The patient is oriented to time, place and person. · Mood & Affect:The patient's mood and affect are appropriate   · Lymphatic: The lymphatic examination bilaterally reveals all areas to be without enlargement or induration  · Sensation: Sensation is intact without deficit  · Coordination/Balance: Good coordination     LUMBAR/SACRAL EXAMINATION:  · Inspection: Local inspection shows no step-off or bruising. Lumbar alignment is normal.  Sagittal and Coronal balance is neutral.      · Palpation:   No evidence of tenderness at the midline. · Range of Motion: Intact flexion mild to moderate loss extension  · Strength:   Strength testing is 5/5 in all muscle groups tested. · Special Tests:   Straight leg raise and crossed SLR negative. Leg length and pelvis level.  0 out of 5 Yusef's signs. · Skin: There are no rashes, ulcerations or lesions. · Reflexes: Reflexes are symmetrically 2+ at the patellar and ankle tendons. .  · Gait & station: Normal, unassisted  · Additional Examinations:   · RIGHT LOWER EXTREMITY: Inspection/examination of the right lower extremity does not show any tenderness, deformity or injury. Range of motion is full. There is no gross instability. There are no rashes, ulcerations or lesions. Strength and tone are normal.  · LEFT LOWER EXTREMITY:  Inspection/examination of the left lower extremity does not show any tenderness, deformity or injury. Range of motion is full. There is no gross instability. There are no rashes, ulcerations or lesions. Strength and tone are normal.      Diagnostic Testing:    Lumbar MRI scan report independently reviewed 4/28/2022 showing small central protrusion L5-S1, multilevel facet arthropathy/synovitis. No high-grade central stenosis.     PT notes reviewed    2 views lumbar spine 3/8/2022 L5-S1 DDD, probable L5 spondylolysis, overlying bowel artifact on AP    Labs reviewed December 2021 hemoglobin A1c 5.2    PCP notes reviewed from 2/28/2022        Impression:  1) 3mo axial discogenic back pain  2) L5-S1 small central protrusion, multilevel facet arthropathy  3) NSAIDS allergy        Plan:   1) We had a long discussion. We reviewed her recent lumbar MRI scan today in detail. We discussed interventional procedures including right L5-S1 LESI vs MBB. She is not currently interested in interventional procedures.   Pamphlets were provided for more information    2) Referral to Dr. Chuck Pulido    3) Cont HEP; proper ergonomics     4) F/u if no improvement         Adwoa Henderson PA-C, MPAS  Board Certified by the [facility] [address] [facility]

## 2023-08-02 ENCOUNTER — PATIENT MESSAGE (OUTPATIENT)
Dept: PRIMARY CARE CLINIC | Age: 53
End: 2023-08-02

## 2023-08-02 DIAGNOSIS — M54.2 NECK PAIN: Primary | ICD-10-CM

## 2023-08-04 ENCOUNTER — TELEPHONE (OUTPATIENT)
Dept: PRIMARY CARE CLINIC | Age: 53
End: 2023-08-04

## 2023-08-04 NOTE — TELEPHONE ENCOUNTER
I spoke with Omar Daniels and she is going to schedule an e-visit on her my-chart  regarding her stiff neck with David Fofana because there are no available appointments in the office until Tuesday.

## 2023-08-08 RX ORDER — METHYLPREDNISOLONE 4 MG/1
TABLET ORAL
Qty: 1 KIT | Refills: 0 | Status: SHIPPED | OUTPATIENT
Start: 2023-08-08

## 2023-08-08 NOTE — TELEPHONE ENCOUNTER
1. Neck pain  - Start methylPREDNISolone (MEDROL DOSEPACK) 4 MG tablet; Take tablets by mouth as directed on packaging. Take tablets with food  Dispense: 1 kit;  Refill: 0

## 2023-09-27 ENCOUNTER — OFFICE VISIT (OUTPATIENT)
Dept: ORTHOPEDIC SURGERY | Age: 53
End: 2023-09-27
Payer: COMMERCIAL

## 2023-09-27 VITALS — HEIGHT: 67 IN | WEIGHT: 155 LBS | BODY MASS INDEX: 24.33 KG/M2

## 2023-09-27 DIAGNOSIS — M50.30 DDD (DEGENERATIVE DISC DISEASE), CERVICAL: ICD-10-CM

## 2023-09-27 DIAGNOSIS — M47.812 CERVICAL SPONDYLOSIS: ICD-10-CM

## 2023-09-27 DIAGNOSIS — M54.2 NECK PAIN: Primary | ICD-10-CM

## 2023-09-27 PROCEDURE — 99214 OFFICE O/P EST MOD 30 MIN: CPT | Performed by: PHYSICIAN ASSISTANT

## 2023-09-27 NOTE — PROGRESS NOTES
Palpation: No evidence of tenderness at the midline. Positive tenderness and tightness left trap  Range of Motion: Intact flexion, mild loss of extension, moderate loss of lateral rotation particularly to the left  Strength: 5/5 bilateral upper extremities   Special Tests:      Spurling's, L'Hermitte's & Vergara's negative bilaterally. \\      Skin:There are no rashes, ulcerations or lesions in right & left upper extremities. Reflexes: Bilaterally triceps, biceps and brachioradialis are 1-2+. Gait & station: Normal unassisted  Additional Examinations:         RIGHT UPPER EXTREMITY:  Inspection/examination of the right upper extremity does not show any tenderness, deformity or injury. Range of motion is full. There is no gross instability. There are no rashes, ulcerations or lesions. Strength and tone are normal.  LEFT UPPER EXTREMITY: Inspection/examination of the left upper extremity does not show any tenderness, deformity or injury. Range of motion is full. There is no gross instability. There are no rashes, ulcerations or lesions. Strength and tone are normal.    Diagnostic Testin view cervical spine 2023 shows multilevel moderate DDD/spondylosis with anterior spurring and wedging, mild retrolisthesis C5-6. Straightening of cervical lordosis. Lumbar MRI scan report independently reviewed 2022 showing small central protrusion L5-S1, multilevel facet arthropathy/synovitis. No high-grade central stenosis. PT notes reviewed    2 views lumbar spine 3/8/2022 L5-S1 DDD, probable L5 spondylolysis, overlying bowel artifact on AP    Labs reviewed 2021 hemoglobin A1c 5.2    PCP notes reviewed from 2022        Impression:  1) 2mo neck pain, underlying myofascial pain   2) C5-6 retrolisthesis, multilevel moderate DDD/spondylosis  3) NSAIDS allergy        Plan:   1)  We had a long discussion. We obtained and reviewed 2 views of her cervical spine today and discussed in detail.

## 2023-09-28 ENCOUNTER — TELEPHONE (OUTPATIENT)
Dept: PRIMARY CARE CLINIC | Age: 53
End: 2023-09-28

## 2023-09-28 ENCOUNTER — TELEPHONE (OUTPATIENT)
Dept: ORTHOPEDIC SURGERY | Age: 53
End: 2023-09-28

## 2023-09-28 DIAGNOSIS — Z12.31 BREAST CANCER SCREENING BY MAMMOGRAM: Primary | ICD-10-CM

## 2023-09-28 NOTE — TELEPHONE ENCOUNTER
L/M for  Leandrew Guess  regarding MRI CERVICAL SPINE WO CONTRAST approval and authorization being valid until 11/25/2023. Patient was instructed that their MRI needs to be scheduled at [facility]. The patient was instructed to contact the facility to schedule  at [phone]. Patient recommended to schedule follow up appointment after scan. If appointment has not been scheduled, patient was provided mainline to schedule at [phone].

## 2023-09-29 NOTE — TELEPHONE ENCOUNTER
Flory Villegas was called and informed that her order was in to schedule her mammogram and I gave her the number to call.

## 2023-10-17 ENCOUNTER — OFFICE VISIT (OUTPATIENT)
Dept: ORTHOPEDIC SURGERY | Age: 53
End: 2023-10-17
Payer: COMMERCIAL

## 2023-10-17 VITALS — HEIGHT: 67 IN | BODY MASS INDEX: 24.33 KG/M2 | WEIGHT: 155 LBS

## 2023-10-17 DIAGNOSIS — M79.18 MYOFASCIAL PAIN SYNDROME, CERVICAL: ICD-10-CM

## 2023-10-17 DIAGNOSIS — M48.02 CERVICAL SPINAL STENOSIS: ICD-10-CM

## 2023-10-17 DIAGNOSIS — M50.20 CERVICAL DISC HERNIATION: ICD-10-CM

## 2023-10-17 DIAGNOSIS — M50.30 DDD (DEGENERATIVE DISC DISEASE), CERVICAL: Primary | ICD-10-CM

## 2023-10-17 PROCEDURE — 99214 OFFICE O/P EST MOD 30 MIN: CPT | Performed by: PHYSICIAN ASSISTANT

## 2023-10-17 NOTE — PROGRESS NOTES
evidence of tenderness at the midline. Positive tenderness and tightness left trap  Range of Motion: Intact flexion, mild loss of extension, moderate loss of lateral rotation particularly to the left  Strength: 5/5 bilateral upper extremities   Special Tests:      Spurling's, L'Hermitte's & Vergara's negative bilaterally. Skin:There are no rashes, ulcerations or lesions in right & left upper extremities. Reflexes: Bilaterally triceps, biceps and brachioradialis are 1-2+. Gait & station: Normal unassisted  Additional Examinations:       RIGHT UPPER EXTREMITY:  Inspection/examination of the right upper extremity does not show any tenderness, deformity or injury. Range of motion is full. There is no gross instability. There are no rashes, ulcerations or lesions. Strength and tone are normal.  LEFT UPPER EXTREMITY: Inspection/examination of the left upper extremity does not show any tenderness, deformity or injury. Range of motion is full. There is no gross instability. There are no rashes, ulcerations or lesions. Strength and tone are normal.    Diagnostic Testing:    Cervical MRI scan report independently reviewed from October 2023 showing multilevel DDD/spondylosis, left spondylitic protrusion C5-6 causing moderate central and severe left foraminal stenosis, multilevel facet arthropathy with varying degrees of foraminal stenosis. No abnormal cord signal noted. 2 view cervical spine 9/27/2023 shows multilevel moderate DDD/spondylosis with anterior spurring and wedging, mild retrolisthesis C5-6. Straightening of cervical lordosis. Lumbar MRI scan report independently reviewed 4/28/2022 showing small central protrusion L5-S1, multilevel facet arthropathy/synovitis. No high-grade central stenosis.     PT notes reviewed    2 views lumbar spine 3/8/2022 L5-S1 DDD, probable L5 spondylolysis, overlying bowel artifact on AP    Labs reviewed December 2021 hemoglobin A1c 5.2    PCP notes reviewed from

## 2023-10-23 ENCOUNTER — HOSPITAL ENCOUNTER (OUTPATIENT)
Dept: PHYSICAL THERAPY | Age: 53
Setting detail: THERAPIES SERIES
Discharge: HOME | End: 2023-10-23
Payer: COMMERCIAL

## 2023-10-23 DIAGNOSIS — M54.2 NECK PAIN: Primary | ICD-10-CM

## 2023-10-23 PROCEDURE — 97161 PT EVAL LOW COMPLEX 20 MIN: CPT

## 2023-10-23 PROCEDURE — 97110 THERAPEUTIC EXERCISES: CPT

## 2023-10-23 NOTE — PLAN OF CARE
Adjusted    Therapist goals for Patient:   Short Term Goals: To be achieved in: 2 weeks  Independent in HEP and progression per patient tolerance, in order to progress toward full function and prevent re-injury. Status: [] Progressing: [] Met: [] Not Met: [] Adjusted  Patient will have a decrease in pain to 0/10 to help  facilitate improvement in movement, function, and ADLs as indicated by functional deficits. Status: [] Progressing: [] Met: [] Not Met: [] Adjusted    Long Term Goals: To be achieved in: 4 weeks  Disability index score of 0% or less for the Neck Disability Index to assist with return to prior level of function. Status: [] Progressing: [] Met: [] Not Met: [] Adjusted  Improve cervical AROM to WNL in all planes and pain free to allow for proper joint functioning as indicated by patients functional deficits. Status: [] Progressing: [] Met: [] Not Met: [] Adjusted  Pt to improve strength to show motor control/activation of deep cervical flexors and scapular retractors to facilitate functional mobility and ADLs. Status: [] Progressing: [] Met: [] Not Met: [] Adjusted  Patient will return to driving without increased symptoms or restriction to work towards return to prior level of function. Status: [] Progressing: [] Met: [] Not Met: [] Adjusted  Patient will resume normal work/leisure activities without pain. Status: [] Progressing: [] Met: [] Not Met: [] Adjusted    TREATMENT PLAN     Frequency/Duration: 1-2x/week for 4 weeks for the following treatment interventions:    Interventions:  [x] Therapeutic exercise including: strength training, ROM, including postural re-education. [x] NMR activation and proprioception, including postural re-education.     [x] Manual therapy as indicated to include: PROM, Gr I-IV mobilizations, STM, and Dry Needling/IASTM  [x] Modalities as needed that may include: Cryotherapy, Electrical Stimulation, Biofeedback, and Thermal Agents  [x]

## 2023-10-23 NOTE — FLOWSHEET NOTE
Met: [] Adjusted     Long Term Goals: To be achieved in: 4 weeks  Disability index score of 0% or less for the Neck Disability Index to assist with return to prior level of function. Status: [] Progressing: [] Met: [] Not Met: [] Adjusted  Improve cervical AROM to WNL in all planes and pain free to allow for proper joint functioning as indicated by patients functional deficits. Status: [] Progressing: [] Met: [] Not Met: [] Adjusted  Pt to improve strength to show motor control/activation of deep cervical flexors and scapular retractors to facilitate functional mobility and ADLs. Status: [] Progressing: [] Met: [] Not Met: [] Adjusted  Patient will return to driving without increased symptoms or restriction to work towards return to prior level of function. Status: [] Progressing: [] Met: [] Not Met: [] Adjusted  Patient will resume normal work/leisure activities without pain. Status: [] Progressing: [] Met: [] Not Met: [] Adjusted       Overall Progression Towards Functional goals/ Treatment Progress Update:  [] Patient is progressing as expected towards functional goals listed. [] Progression is slowed due to complexities/Impairments listed. [] Progression has been slowed due to co-morbidities. [x] Plan just implemented, too soon (<30days) to assess goals progression   [] Goals require adjustment due to lack of progress  [] Patient is not progressing as expected and requires additional follow up with physician  [] Other:     CHARGE CAPTURE     CHARGE GRID   CPT Code (TIMED) minutes # CPT Code (UNTIMED) #     [x] Therex (61052)  15 1  [] EVAL:LOW (26841 - Typically 20 minutes face-to-face) 1    [] Neuromusc. Re-ed (57543)    [] Re-Eval (19814)     [] Manual ([id number])    [] Estim Unattended (64830)     [] Ther. Act (38649)    [] Gianluca Humphreys.  Traction (36532)

## 2023-10-30 ENCOUNTER — HOSPITAL ENCOUNTER (OUTPATIENT)
Dept: PHYSICAL THERAPY | Age: 53
Setting detail: THERAPIES SERIES
Discharge: HOME | End: 2023-10-30
Payer: COMMERCIAL

## 2023-10-30 PROCEDURE — 97140 MANUAL THERAPY 1/> REGIONS: CPT

## 2023-10-30 PROCEDURE — 97110 THERAPEUTIC EXERCISES: CPT

## 2023-10-30 PROCEDURE — 97112 NEUROMUSCULAR REEDUCATION: CPT

## 2023-10-30 NOTE — FLOWSHEET NOTE
[address] [place] [address] and Therapy [address] [place] [address]., [address] [facility], [address] [place] [address] office: [phone] fax: [phone]      Physical Therapy: TREATMENT/PROGRESS NOTE   Patient: Madelaine Reeves (37 y.o. female)   Treatment Date: 10/30/2023   :  1970 MRN: [id number]   Visit #: 2   Insurance Allowable Auth Needed   Medical [place] []Yes    [x]No    Insurance: Payor: Ruben Locke / Plan: [place] / Product Type: *No Product type* /   Insurance ID: [id number] - ([facility])  Secondary Insurance (if applicable):    Treatment Diagnosis:     ICD-10-CM    1. Neck pain  M54.2          Medical Diagnosis:    DDD (degenerative disc disease), cervical [M50.30]  Cervical disc herniation [M50.20]  Cervical spinal stenosis [M48.02]  Myofascial pain syndrome, cervical [M79.18]   Referring Physician: Mason Bravo  PCP: ROGELIO Oneill CNP                             Plan of care signed (Y/N):     Date of Patient follow up with Physician:      Progress Report/POC: NO  POC update due: 2023 (OR 10 visits /OR [address], whichever is less)      Preferred Language for Healthcare:   [x]English       []other:    SUBJECTIVE EXAMINATION     Patient Report/Comments: 10/30 Pt reports she is completing HEP with no issues and pain intensity as well as mobility is improving. Still has restrictions with cervical rotation L worse then R. Test used Initial score  10/23/23 10/30/2023   Pain Summary VAS 4-5 2 with movement   Functional questionnaire Neck Disability Index 16 -   Other:      Cervical AROM   60 deg rot R  49 deg rot L        OBJECTIVE EXAMINATION     Observation:     Test measurements: see eval    Exercise/Equipment Resistance/Repetitions Other comments   Stretching/PROM     Cervical rotation 5\"hx10 L 10/30 completed after manual therapy.  seated with over pressure from towel    Cervical extension 5\"hx10 AROM  10/30 towel over pressure and cues to apply at proximal

## 2023-11-06 ENCOUNTER — HOSPITAL ENCOUNTER (OUTPATIENT)
Dept: PHYSICAL THERAPY | Age: 53
Setting detail: THERAPIES SERIES
Discharge: HOME | End: 2023-11-06
Payer: COMMERCIAL

## 2023-11-06 PROCEDURE — 97110 THERAPEUTIC EXERCISES: CPT

## 2023-11-06 PROCEDURE — 97140 MANUAL THERAPY 1/> REGIONS: CPT

## 2023-11-06 PROCEDURE — 97112 NEUROMUSCULAR REEDUCATION: CPT

## 2023-11-06 NOTE — FLOWSHEET NOTE
[address] [place] [address] and Therapy [address] [place] [address]., [address] [facility], [address] [place] [address] office: [phone] fax: [phone]      Physical Therapy: TREATMENT/PROGRESS NOTE   Patient: Tata Barrow (30 y.o. female)   Treatment Date: 2023   :  1970 MRN: [id number]   Visit #: 3   Insurance Allowable Auth Needed   Medical [place] []Yes    [x]No    Insurance: Payor: Vinicio Mason / Plan: [place] / Product Type: *No Product type* /   Insurance ID: [id number] - ([facility])  Secondary Insurance (if applicable):    Treatment Diagnosis:     ICD-10-CM    1. Neck pain  M54.2          Medical Diagnosis:    DDD (degenerative disc disease), cervical [M50.30]  Cervical disc herniation [M50.20]  Cervical spinal stenosis [M48.02]  Myofascial pain syndrome, cervical [M79.18]   Referring Physician: Elvin Dhaliwal  PCP: ROGELIO Kee CNP                             Plan of care signed (Y/N):     Date of Patient follow up with Physician:      Progress Report/POC: NO  POC update due: 2023 (OR 10 visits /OR [address], whichever is less)      Preferred Language for Healthcare:   [x]English       []other:    SUBJECTIVE EXAMINATION     Patient Report/Comments: : Pt sates she was moving boxes and furniture yesterday causing increase neck at the end of the day that improved by this morning. Overall symptoms are continuing to improve. Test used Initial score  10/23/23 2023   Pain Summary VAS 4-5 2-3   Functional questionnaire Neck Disability Index 16 -   Other:      Cervical AROM   60 deg rot R  49 deg rot L    Palpation   Mild muscle tension bilateral UT/ levator       OBJECTIVE EXAMINATION     Observation:     Test measurements: see eval    Exercise/Equipment Resistance/Repetitions Other comments   Stretching/PROM     Cervical rotation 5\"hx10 L 10/30 completed after manual therapy.  seated with over pressure from towel    Cervical extension 5\"hx10 AROM  10/30

## 2023-11-09 ENCOUNTER — HOSPITAL ENCOUNTER (OUTPATIENT)
Dept: WOMENS IMAGING | Age: 53
Discharge: HOME | End: 2023-11-09
Payer: COMMERCIAL

## 2023-11-09 VITALS — HEIGHT: 67 IN | WEIGHT: 160 LBS | BODY MASS INDEX: 25.11 KG/M2

## 2023-11-09 DIAGNOSIS — Z12.31 BREAST CANCER SCREENING BY MAMMOGRAM: ICD-10-CM

## 2023-11-09 PROCEDURE — 77063 BREAST TOMOSYNTHESIS BI: CPT

## 2023-11-15 ENCOUNTER — HOSPITAL ENCOUNTER (OUTPATIENT)
Dept: PHYSICAL THERAPY | Age: 53
Setting detail: THERAPIES SERIES
Discharge: HOME | End: 2023-11-15
Payer: COMMERCIAL

## 2023-11-15 PROCEDURE — 97112 NEUROMUSCULAR REEDUCATION: CPT

## 2023-11-15 PROCEDURE — 97110 THERAPEUTIC EXERCISES: CPT

## 2023-11-15 PROCEDURE — 97140 MANUAL THERAPY 1/> REGIONS: CPT

## 2023-11-15 NOTE — FLOWSHEET NOTE
cervical flexors and scapular retractors to facilitate functional mobility and ADLs. Status: [] Progressing: [] Met: [] Not Met: [] Adjusted  Patient will return to driving without increased symptoms or restriction to work towards return to prior level of function. Status: [] Progressing: [] Met: [] Not Met: [] Adjusted  Patient will resume normal work/leisure activities without pain. Status: [] Progressing: [] Met: [] Not Met: [] Adjusted       Overall Progression Towards Functional goals/ Treatment Progress Update:  [] Patient is progressing as expected towards functional goals listed. [] Progression is slowed due to complexities/Impairments listed. [] Progression has been slowed due to co-morbidities. [x] Plan just implemented, too soon (<30days) to assess goals progression   [] Goals require adjustment due to lack of progress  [] Patient is not progressing as expected and requires additional follow up with physician  [] Other:     CHARGE CAPTURE     CHARGE GRID   CPT Code (TIMED) minutes # CPT Code (UNTIMED) #     [x] Therex (48613)  12 1  [] EVAL:LOW (95002 - Typically 20 minutes face-to-face)     [x] Neuromusc. Re-ed (53298) 12 1  [] Re-Eval (67147)     [x] Manual (37334) 10 1  [] Estim Unattended (23489)     [] Ther. Act (61392)    [] Frank Beardsley. Traction (30511)     [] Gait (49223)    [] Dry Needle 1-2 muscle (32650)     [] Aquatic Therex (38169)    [] Dry Needle 3+ muscle (02211)     [] Iontophoresis (18226)    [] VASO (87532)     [] Ultrasound (24706)    [] Group Therapy (24253)     [] Estim Attended (34163)    [] Other:     Total Timed Code Tx Minutes 44 3       Total Treatment Minutes 4:42-5:26  44        Charge Justification:  (02484) THERAPEUTIC EXERCISE - Provided verbal/tactile cueing for activities related to strengthening, flexibility, endurance, ROM performed

## 2023-11-22 ENCOUNTER — APPOINTMENT (OUTPATIENT)
Dept: PHYSICAL THERAPY | Age: 53
End: 2023-11-22
Payer: COMMERCIAL

## 2024-08-19 ENCOUNTER — OFFICE VISIT (OUTPATIENT)
Dept: PRIMARY CARE CLINIC | Age: 54
End: 2024-08-19
Payer: COMMERCIAL

## 2024-08-19 VITALS
HEART RATE: 64 BPM | BODY MASS INDEX: 26.16 KG/M2 | OXYGEN SATURATION: 99 % | DIASTOLIC BLOOD PRESSURE: 62 MMHG | SYSTOLIC BLOOD PRESSURE: 104 MMHG | WEIGHT: 167 LBS

## 2024-08-19 DIAGNOSIS — Z13.1 DIABETES MELLITUS SCREENING: ICD-10-CM

## 2024-08-19 DIAGNOSIS — Z12.4 ENCOUNTER FOR PAPANICOLAOU SMEAR OF CERVIX: ICD-10-CM

## 2024-08-19 DIAGNOSIS — Z12.31 BREAST CANCER SCREENING BY MAMMOGRAM: ICD-10-CM

## 2024-08-19 DIAGNOSIS — Z01.419 VISIT FOR GYNECOLOGIC EXAMINATION: Primary | ICD-10-CM

## 2024-08-19 DIAGNOSIS — Z13.21 ENCOUNTER FOR VITAMIN DEFICIENCY SCREENING: ICD-10-CM

## 2024-08-19 DIAGNOSIS — Z13.220 LIPID SCREENING: ICD-10-CM

## 2024-08-19 PROCEDURE — 99396 PREV VISIT EST AGE 40-64: CPT | Performed by: NURSE PRACTITIONER

## 2024-08-19 SDOH — ECONOMIC STABILITY: FOOD INSECURITY: WITHIN THE PAST 12 MONTHS, THE FOOD YOU BOUGHT JUST DIDN'T LAST AND YOU DIDN'T HAVE MONEY TO GET MORE.: NEVER TRUE

## 2024-08-19 SDOH — ECONOMIC STABILITY: FOOD INSECURITY: WITHIN THE PAST 12 MONTHS, YOU WORRIED THAT YOUR FOOD WOULD RUN OUT BEFORE YOU GOT MONEY TO BUY MORE.: NEVER TRUE

## 2024-08-19 SDOH — ECONOMIC STABILITY: INCOME INSECURITY: HOW HARD IS IT FOR YOU TO PAY FOR THE VERY BASICS LIKE FOOD, HOUSING, MEDICAL CARE, AND HEATING?: NOT HARD AT ALL

## 2024-08-19 ASSESSMENT — PATIENT HEALTH QUESTIONNAIRE - PHQ9
SUM OF ALL RESPONSES TO PHQ9 QUESTIONS 1 & 2: 1
SUM OF ALL RESPONSES TO PHQ QUESTIONS 1-9: 1
1. LITTLE INTEREST OR PLEASURE IN DOING THINGS: NOT AT ALL
2. FEELING DOWN, DEPRESSED OR HOPELESS: SEVERAL DAYS
SUM OF ALL RESPONSES TO PHQ QUESTIONS 1-9: 1

## 2024-08-19 NOTE — PROGRESS NOTES
Alcohol use: Yes     Comment: occassional    Drug use: Never     Social Determinants of Health     Financial Resource Strain: Low Risk  (8/19/2024)    Overall Financial Resource Strain (CARDIA)     Difficulty of Paying Living Expenses: Not hard at all   Food Insecurity: No Food Insecurity (8/19/2024)    Hunger Vital Sign     Worried About Running Out of Food in the Last Year: Never true     Ran Out of Food in the Last Year: Never true   Transportation Needs: Unknown (8/19/2024)    PRAPARE - Transportation     Lack of Transportation (Non-Medical): No    Received from [facility]     Interpersonal Safety   Housing Stability: Unknown (8/19/2024)    Housing Stability Vital Sign     Homeless in the Last Year: No     Current Outpatient Medications on File Prior to Visit   Medication Sig Dispense Refill    UNABLE TO FIND Osteomatric vitamin      vitamin D3 (CHOLECALCIFEROL) 25 MCG (1000 UT) TABS tablet Take 1 tablet by mouth daily      fluticasone (FLONASE) 50 MCG/ACT nasal spray 1 spray by Each Nostril route daily      Multiple Vitamins-Minerals (QC WOMENS DAILY MULTIVITAMIN) TABS Take 1 tablet by mouth daily      fexofenadine (ALLEGRA) 180 MG tablet Take 1 tablet by mouth       No current facility-administered medications on file prior to visit.     Allergies   Allergen Reactions    Ibuprofen Anaphylaxis, Hives and Swelling     ENT edema  ENT edema      Shellfish-Derived Products Hives     Hives, swelling    Sulfa Antibiotics Hives       Review of Systems  Constitutional: negative for anorexia, chills, fatigue, fevers, malaise, sweats, and weight loss  Respiratory: negative for asthma, chronic bronchitis, dyspnea on exertion, pleurisy/chest pain, shortness of breath, sputum, and wheezing  Cardiovascular: negative for chest pain, chest pressure/discomfort, dyspnea, exertional chest pressure/discomfort, fatigue, irregular heart beat, lower extremity edema, palpitations, paroxysmal nocturnal dyspnea, syncope, and

## 2024-09-06 DIAGNOSIS — Z13.21 ENCOUNTER FOR VITAMIN DEFICIENCY SCREENING: ICD-10-CM

## 2024-09-06 DIAGNOSIS — Z13.220 LIPID SCREENING: ICD-10-CM

## 2024-09-06 DIAGNOSIS — Z01.419 VISIT FOR GYNECOLOGIC EXAMINATION: ICD-10-CM

## 2024-09-06 LAB
25(OH)D3 SERPL-MCNC: 23.5 NG/ML
ALBUMIN SERPL-MCNC: 4.3 G/DL (ref 3.4–5)
ALBUMIN/GLOB SERPL: 2 {RATIO} (ref 1.1–2.2)
ALP SERPL-CCNC: 53 U/L (ref 40–129)
ALT SERPL-CCNC: 18 U/L (ref 10–40)
ANION GAP SERPL CALCULATED.3IONS-SCNC: 11 MMOL/L (ref 3–16)
AST SERPL-CCNC: 20 U/L (ref 15–37)
BILIRUB SERPL-MCNC: 0.3 MG/DL (ref 0–1)
BUN SERPL-MCNC: 9 MG/DL (ref 7–20)
CALCIUM SERPL-MCNC: 9.3 MG/DL (ref 8.3–10.6)
CHLORIDE SERPL-SCNC: 105 MMOL/L (ref 99–110)
CHOLEST SERPL-MCNC: 201 MG/DL (ref 0–199)
CO2 SERPL-SCNC: 25 MMOL/L (ref 21–32)
CREAT SERPL-MCNC: 0.9 MG/DL (ref 0.6–1.1)
GFR SERPLBLD CREATININE-BSD FMLA CKD-EPI: 77 ML/MIN/{1.73_M2}
GLUCOSE P FAST SERPL-MCNC: 93 MG/DL (ref 70–99)
HDLC SERPL-MCNC: 77 MG/DL (ref 40–60)
LDL CHOLESTEROL: 113 MG/DL
POTASSIUM SERPL-SCNC: 4.3 MMOL/L (ref 3.5–5.1)
PROT SERPL-MCNC: 6.4 G/DL (ref 6.4–8.2)
SODIUM SERPL-SCNC: 141 MMOL/L (ref 136–145)
TRIGL SERPL-MCNC: 56 MG/DL (ref 0–150)
VLDLC SERPL CALC-MCNC: 11 MG/DL

## 2024-10-17 ENCOUNTER — TELEPHONE (OUTPATIENT)
Dept: PRIMARY CARE CLINIC | Age: 54
End: 2024-10-17

## 2024-10-17 DIAGNOSIS — E55.9 VITAMIN D INSUFFICIENCY: Primary | ICD-10-CM

## 2024-10-17 RX ORDER — CHOLECALCIFEROL (VITAMIN D3) 125 MCG
5000 CAPSULE ORAL DAILY
COMMUNITY
Start: 2024-10-17

## 2024-10-17 NOTE — PROGRESS NOTES
1. Vitamin D insufficiency  -Vitamin D level 9/6/2024--> 23.5 (new diagnosis).  -Start Vitamin D 125 MCG (5000 UT) CAPS capsule; Take 1 capsule by mouth daily  -Stop vitamin D 1000 UT daily

## 2024-10-17 NOTE — TELEPHONE ENCOUNTER
----- Message from ROGELIO Ortega CNP sent at 10/17/2024  1:20 PM EDT -----  Please notify patient of lab results.    1.  Vitamin D low, 23.5 (normal ranges above 30).  -Start vitamin D 5,000 UT daily (pt to purchase over-the-counter).  -Stop vitamin D 1,000 units daily    2.  Cholesterol number, LDL (bad cholesterol, 117.  The goal is under 100.  Watch saturated fats and continue regular exercise and maintain healthy weight.    3.  Liver and kidney function is normal.  Thank you.

## 2024-11-16 ENCOUNTER — HOSPITAL ENCOUNTER (OUTPATIENT)
Dept: WOMENS IMAGING | Age: 54
Discharge: HOME | End: 2024-11-16
Payer: COMMERCIAL

## 2024-11-16 VITALS — HEIGHT: 68 IN | WEIGHT: 169 LBS | BODY MASS INDEX: 25.61 KG/M2

## 2024-11-16 DIAGNOSIS — Z12.31 BREAST CANCER SCREENING BY MAMMOGRAM: ICD-10-CM

## 2024-11-16 PROCEDURE — 77063 BREAST TOMOSYNTHESIS BI: CPT

## 2024-12-30 ENCOUNTER — TELEPHONE (OUTPATIENT)
Dept: ORTHOPEDIC SURGERY | Age: 54
End: 2024-12-30

## 2024-12-30 ENCOUNTER — OFFICE VISIT (OUTPATIENT)
Dept: ORTHOPEDIC SURGERY | Age: 54
End: 2024-12-30
Payer: COMMERCIAL

## 2024-12-30 VITALS — HEIGHT: 68 IN | BODY MASS INDEX: 25.61 KG/M2 | WEIGHT: 169 LBS

## 2024-12-30 DIAGNOSIS — M79.18 MYOFASCIAL PAIN SYNDROME OF LUMBAR SPINE: ICD-10-CM

## 2024-12-30 DIAGNOSIS — M51.360 DEGENERATION OF INTERVERTEBRAL DISC OF LUMBAR REGION WITH DISCOGENIC BACK PAIN: ICD-10-CM

## 2024-12-30 DIAGNOSIS — M54.50 LUMBAR PAIN: Primary | ICD-10-CM

## 2024-12-30 PROCEDURE — 99214 OFFICE O/P EST MOD 30 MIN: CPT | Performed by: PHYSICIAN ASSISTANT

## 2024-12-30 RX ORDER — METHYLPREDNISOLONE 4 MG/1
TABLET ORAL
Qty: 1 KIT | Refills: 0 | Status: SHIPPED | OUTPATIENT
Start: 2024-12-30

## 2024-12-30 RX ORDER — METHOCARBAMOL 500 MG/1
TABLET, FILM COATED ORAL
COMMUNITY
Start: 2024-12-28 | End: 2024-12-30 | Stop reason: SDUPTHER

## 2024-12-30 RX ORDER — METHOCARBAMOL 500 MG/1
500 TABLET, FILM COATED ORAL 2 TIMES DAILY PRN
Qty: 20 TABLET | Refills: 0 | Status: SHIPPED | OUTPATIENT
Start: 2024-12-30 | End: 2025-01-14

## 2024-12-30 NOTE — TELEPHONE ENCOUNTER
Appointment Request     Patient requesting earlier appointment: Yes  Appointment offered to patient: yes   Patient Contact Number: [phone]     PATIENT CALLING TO GET SEEN TODAY 12/30/24    PATIENT WAS SEEN AT ER AT [facility] 12/28/24 DUE BACK PAIN     PATIENT WILLING TO GO ANY LOCATION     PATIENT WOULD LIKE TO SEE IF THEY CAN GET IN TODAY     PLEASE CALL PATIENT AT THE ABOVE NUMBER

## 2024-12-30 NOTE — PROGRESS NOTES
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  We obtained and reviewed updated lumbar x-rays today and discussed treatment options in detail.  She has elected to pursue treatment course includin) Cont brace PRN  3) Start PT with HEP and incorporate dry needling  4) Robaxin 500mg I po BID PRN; #20--side effect/risk discussed  5) MDP to hold onto only if severe flare up--side effect/risk discussed  6) Discussed concerning s/sx  7) F/u 4-6wks, updated L MRI if no further improvement       Adwoa Aguirre PA-C, MPAS  Board Certified by the [facility]

## 2024-12-30 NOTE — TELEPHONE ENCOUNTER
S/W the patient informing her I was returning her call. The patient was offered a same day add on for Monday 12/30/2024 at our [place] office at 1PM. She voiced understanding and will take that appointment. She was provided the address to our office.

## 2024-12-31 ENCOUNTER — TELEPHONE (OUTPATIENT)
Dept: ORTHOPEDIC SURGERY | Age: 54
End: 2024-12-31

## 2024-12-31 NOTE — TELEPHONE ENCOUNTER
S/W the patient regarding work note & restrictions. I informed her this note will be placed in her MyChart. She voiced understanding. She may call back with any questions or concerns.

## 2024-12-31 NOTE — TELEPHONE ENCOUNTER
L/M for the patient informing her I was returning her call regarding her message. I informed the patient that I just needed clarification if the patient needs a work note saying she was seen in the office or if she needed a work note with restrictions. She may call back or send a message in [facility] with what she needed. She was informed I will be out of the office this afternoon and will be back in on Thursday 1/2/2025.

## 2024-12-31 NOTE — TELEPHONE ENCOUNTER
Other PATIENT IS CALLING IN REQUESTING A WORK NOTE FOR HER LAST VISIT. WOULD LIKE THAT NOTE PLACED IN [employer]

## 2024-12-31 NOTE — TELEPHONE ENCOUNTER
General Question     Subject: WORK NOTE   Patient and /or Facility Request: Fernanda Law   Contact Number: [phone]     PATIENT CALLED IN TO SPEAK TO SOMEONE IN THE OFFICE TO SEE IF SHE CAN GET AN WORK NOTE. SHE JUST MISSED WORK YESTERDAY AND TODAY. IF SHE DON'T HAVE THE NOTE SHE WILL NOT GET PAID. IF IT HAVE TO RESTRICTIONS. CAN T BE ADDED IN HER LETTER...    REQ A CALL BACK…    PLEASE ADVISE

## 2025-04-30 SDOH — ECONOMIC STABILITY: INCOME INSECURITY: IN THE LAST 12 MONTHS, WAS THERE A TIME WHEN YOU WERE NOT ABLE TO PAY THE MORTGAGE OR RENT ON TIME?: NO

## 2025-04-30 SDOH — ECONOMIC STABILITY: TRANSPORTATION INSECURITY
IN THE PAST 12 MONTHS, HAS LACK OF TRANSPORTATION KEPT YOU FROM MEETINGS, WORK, OR FROM GETTING THINGS NEEDED FOR DAILY LIVING?: NO

## 2025-04-30 SDOH — ECONOMIC STABILITY: FOOD INSECURITY: WITHIN THE PAST 12 MONTHS, YOU WORRIED THAT YOUR FOOD WOULD RUN OUT BEFORE YOU GOT MONEY TO BUY MORE.: NEVER TRUE

## 2025-04-30 SDOH — ECONOMIC STABILITY: FOOD INSECURITY: WITHIN THE PAST 12 MONTHS, THE FOOD YOU BOUGHT JUST DIDN'T LAST AND YOU DIDN'T HAVE MONEY TO GET MORE.: NEVER TRUE

## 2025-04-30 SDOH — ECONOMIC STABILITY: TRANSPORTATION INSECURITY
IN THE PAST 12 MONTHS, HAS THE LACK OF TRANSPORTATION KEPT YOU FROM MEDICAL APPOINTMENTS OR FROM GETTING MEDICATIONS?: NO

## 2025-04-30 ASSESSMENT — PATIENT HEALTH QUESTIONNAIRE - PHQ9
SUM OF ALL RESPONSES TO PHQ QUESTIONS 1-9: 0
1. LITTLE INTEREST OR PLEASURE IN DOING THINGS: NOT AT ALL
1. LITTLE INTEREST OR PLEASURE IN DOING THINGS: NOT AT ALL
SUM OF ALL RESPONSES TO PHQ9 QUESTIONS 1 & 2: 0
2. FEELING DOWN, DEPRESSED OR HOPELESS: NOT AT ALL
SUM OF ALL RESPONSES TO PHQ QUESTIONS 1-9: 0
2. FEELING DOWN, DEPRESSED OR HOPELESS: NOT AT ALL
SUM OF ALL RESPONSES TO PHQ QUESTIONS 1-9: 0
SUM OF ALL RESPONSES TO PHQ QUESTIONS 1-9: 0

## 2025-05-01 ENCOUNTER — OFFICE VISIT (OUTPATIENT)
Dept: PRIMARY CARE CLINIC | Age: 55
End: 2025-05-01
Payer: COMMERCIAL

## 2025-05-01 VITALS
SYSTOLIC BLOOD PRESSURE: 122 MMHG | TEMPERATURE: 97.7 F | HEIGHT: 67 IN | BODY MASS INDEX: 27.47 KG/M2 | WEIGHT: 175 LBS | OXYGEN SATURATION: 97 % | DIASTOLIC BLOOD PRESSURE: 76 MMHG | HEART RATE: 76 BPM

## 2025-05-01 DIAGNOSIS — N92.1 MENORRHAGIA WITH IRREGULAR CYCLE: ICD-10-CM

## 2025-05-01 DIAGNOSIS — N95.1 PERIMENOPAUSAL SYMPTOMS: Primary | ICD-10-CM

## 2025-05-01 LAB
BASOPHILS # BLD: 0 K/UL (ref 0–0.2)
BASOPHILS NFR BLD: 0.7 %
DEPRECATED RDW RBC AUTO: 13.2 % (ref 12.4–15.4)
EOSINOPHIL # BLD: 0.2 K/UL (ref 0–0.6)
EOSINOPHIL NFR BLD: 3.8 %
FERRITIN SERPL IA-MCNC: 10.9 NG/ML (ref 15–150)
HCT VFR BLD AUTO: 34.8 % (ref 36–48)
HGB BLD-MCNC: 11.9 G/DL (ref 12–16)
IRON SATN MFR SERPL: 10 % (ref 15–50)
IRON SERPL-MCNC: 44 UG/DL (ref 37–145)
LYMPHOCYTES # BLD: 1.4 K/UL (ref 1–5.1)
LYMPHOCYTES NFR BLD: 28.7 %
MCH RBC QN AUTO: 30.4 PG (ref 26–34)
MCHC RBC AUTO-ENTMCNC: 34.2 G/DL (ref 31–36)
MCV RBC AUTO: 88.7 FL (ref 80–100)
MONOCYTES # BLD: 0.4 K/UL (ref 0–1.3)
MONOCYTES NFR BLD: 7.6 %
NEUTROPHILS # BLD: 2.8 K/UL (ref 1.7–7.7)
NEUTROPHILS NFR BLD: 59.2 %
PLATELET # BLD AUTO: 244 K/UL (ref 135–450)
PMV BLD AUTO: 10.2 FL (ref 5–10.5)
RBC # BLD AUTO: 3.93 M/UL (ref 4–5.2)
TIBC SERPL-MCNC: 422 UG/DL (ref 260–445)
WBC # BLD AUTO: 4.7 K/UL (ref 4–11)

## 2025-05-01 PROCEDURE — 99214 OFFICE O/P EST MOD 30 MIN: CPT | Performed by: NURSE PRACTITIONER

## 2025-05-01 SDOH — ECONOMIC STABILITY: FOOD INSECURITY: WITHIN THE PAST 12 MONTHS, YOU WORRIED THAT YOUR FOOD WOULD RUN OUT BEFORE YOU GOT MONEY TO BUY MORE.: NEVER TRUE

## 2025-05-01 SDOH — ECONOMIC STABILITY: FOOD INSECURITY: WITHIN THE PAST 12 MONTHS, THE FOOD YOU BOUGHT JUST DIDN'T LAST AND YOU DIDN'T HAVE MONEY TO GET MORE.: NEVER TRUE

## 2025-05-01 ASSESSMENT — ENCOUNTER SYMPTOMS
CHEST TIGHTNESS: 0
WHEEZING: 0
COUGH: 0
SHORTNESS OF BREATH: 0
GASTROINTESTINAL NEGATIVE: 1

## 2025-05-01 NOTE — PROGRESS NOTES
5/1/2025    Chief Complaint   Patient presents with    Menstrual Problem     20 days cycles that are heavier than usually, has been going on since Sept 2025       Fernanda Law is a 54 y.o. female, presents today for evaluation of perimenopausal symptoms and irregular cycles    HPI   Perimenopausal symptoms  Patient reports perimenopausal symptoms and irregular periods.  Last menstrual period: 3/26/25 that lasted until 4/11/25.  Prior LMP cycles have been irregular.   Blood flow is heavy the past couple cycles.she wears a super pad (most absorbant pad available) and changes it every 2 hours.     Perimenopausal symptoms including irregular menstrual cycle, insomnia, hot flashes.    She previously saw GYN, Dr. Harper Osullivan when she was with [facility]; will send new referral today..       Review of Systems   Constitutional:  Negative for activity change, appetite change, diaphoresis, fatigue and unexpected weight change.   Respiratory:  Negative for cough, chest tightness, shortness of breath and wheezing.    Cardiovascular:  Negative for chest pain, palpitations and leg swelling.   Gastrointestinal: Negative.    Genitourinary:  Positive for menstrual problem (Irregular menstrual period). Negative for pelvic pain, vaginal bleeding, vaginal discharge and vaginal pain.   Neurological:  Negative for dizziness, facial asymmetry, weakness, light-headedness, numbness and headaches.       Current Outpatient Medications on File Prior to Visit   Medication Sig Dispense Refill    Vitamin D 125 MCG (5000 UT) CAPS capsule Take 1 capsule by mouth daily      UNABLE TO FIND Osteomatric vitamin      fluticasone (FLONASE) 50 MCG/ACT nasal spray 1 spray by Each Nostril route daily      Multiple Vitamins-Minerals (QC WOMENS DAILY MULTIVITAMIN) TABS Take 1 tablet by mouth daily       No current facility-administered medications on file prior to visit.      Allergies   Allergen Reactions    Ibuprofen Anaphylaxis, Hives and

## 2025-05-06 ENCOUNTER — PATIENT MESSAGE (OUTPATIENT)
Dept: PRIMARY CARE CLINIC | Age: 55
End: 2025-05-06

## 2025-05-06 DIAGNOSIS — Z91.013 SHELLFISH ALLERGY: Primary | ICD-10-CM

## 2025-05-07 RX ORDER — EPINEPHRINE 0.3 MG/.3ML
INJECTION SUBCUTANEOUS
Qty: 2 EACH | Refills: 1 | Status: SHIPPED | OUTPATIENT
Start: 2025-05-07

## 2025-05-07 NOTE — TELEPHONE ENCOUNTER
1. Shellfish allergy  - EPINEPHrine (EPIPEN 2-SOLO) 0.3 MG/0.3ML SOAJ injection; Inject medication into thigh once for severe allergic reaction. Call 9-1-1 after injection  Dispense: 2 each; Refill: 1

## 2025-05-08 ENCOUNTER — TELEPHONE (OUTPATIENT)
Dept: PRIMARY CARE CLINIC | Age: 55
End: 2025-05-08

## 2025-06-25 ENCOUNTER — RESULTS FOLLOW-UP (OUTPATIENT)
Dept: PRIMARY CARE CLINIC | Age: 55
End: 2025-06-25

## 2025-06-25 DIAGNOSIS — D50.9 IRON DEFICIENCY ANEMIA, UNSPECIFIED IRON DEFICIENCY ANEMIA TYPE: Primary | ICD-10-CM

## 2025-06-25 RX ORDER — FERROUS SULFATE 325(65) MG
TABLET ORAL
Qty: 180 TABLET | Refills: 1 | Status: SHIPPED | OUTPATIENT
Start: 2025-06-25 | End: 2025-07-11 | Stop reason: SDUPTHER

## 2025-06-25 RX ORDER — ASCORBIC ACID 500 MG
TABLET ORAL
Qty: 90 TABLET | Refills: 1 | Status: SHIPPED | OUTPATIENT
Start: 2025-06-25

## 2025-07-11 ENCOUNTER — TELEPHONE (OUTPATIENT)
Dept: PRIMARY CARE CLINIC | Age: 55
End: 2025-07-11

## 2025-07-11 DIAGNOSIS — D50.9 IRON DEFICIENCY ANEMIA, UNSPECIFIED IRON DEFICIENCY ANEMIA TYPE: Primary | ICD-10-CM

## 2025-07-11 RX ORDER — FERROUS SULFATE 325(65) MG
TABLET ORAL
Qty: 180 TABLET | Refills: 1 | Status: SHIPPED | OUTPATIENT
Start: 2025-07-11

## 2025-07-11 NOTE — TELEPHONE ENCOUNTER
Robert needs a refill of her iron and would like it to go to the [facility] on [address].   negative

## 2025-07-11 NOTE — TELEPHONE ENCOUNTER
1. Iron deficiency anemia, unspecified iron deficiency anemia type  - Continue ferrous sulfate (IRON 325) 325 (65 Fe) MG tablet; Take 1 tablet by mouth twice daily with meals.  Take with vitamin C.  Dispense: 180 tablet; Refill: 1

## 2025-07-11 NOTE — TELEPHONE ENCOUNTER
1. Iron deficiency anemia, unspecified iron deficiency anemia type  - ferrous sulfate (IRON 325) 325 (65 Fe) MG tablet; Take 1 tablet by mouth twice daily with meals.  Take with vitamin C.  Dispense: 180 tablet; Refill: 1